# Patient Record
Sex: MALE | Race: OTHER | HISPANIC OR LATINO | Employment: OTHER | URBAN - METROPOLITAN AREA
[De-identification: names, ages, dates, MRNs, and addresses within clinical notes are randomized per-mention and may not be internally consistent; named-entity substitution may affect disease eponyms.]

---

## 2017-01-19 ENCOUNTER — APPOINTMENT (OUTPATIENT)
Dept: LAB | Facility: CLINIC | Age: 47
End: 2017-01-19
Payer: COMMERCIAL

## 2017-01-19 ENCOUNTER — TRANSCRIBE ORDERS (OUTPATIENT)
Dept: LAB | Facility: CLINIC | Age: 47
End: 2017-01-19

## 2017-01-19 ENCOUNTER — GENERIC CONVERSION - ENCOUNTER (OUTPATIENT)
Dept: OTHER | Facility: OTHER | Age: 47
End: 2017-01-19

## 2017-01-19 ENCOUNTER — ALLSCRIPTS OFFICE VISIT (OUTPATIENT)
Dept: OTHER | Facility: OTHER | Age: 47
End: 2017-01-19

## 2017-01-19 DIAGNOSIS — R79.89 OTHER ABNORMAL BLOOD CHEMISTRY: Primary | ICD-10-CM

## 2017-01-19 LAB
HBA1C MFR BLD HPLC: 6.1 %
VENIPUNCTURE: NORMAL

## 2017-01-19 PROCEDURE — 36415 COLL VENOUS BLD VENIPUNCTURE: CPT | Performed by: NURSE PRACTITIONER

## 2017-01-20 ENCOUNTER — GENERIC CONVERSION - ENCOUNTER (OUTPATIENT)
Dept: OTHER | Facility: OTHER | Age: 47
End: 2017-01-20

## 2017-01-20 LAB
A/G RATIO (HISTORICAL): 1.4 (ref 1.1–2.5)
ALBUMIN SERPL BCP-MCNC: 4.3 G/DL (ref 3.5–5.5)
ALP SERPL-CCNC: 87 IU/L (ref 39–117)
ALT SERPL W P-5'-P-CCNC: 107 IU/L (ref 0–44)
AST SERPL W P-5'-P-CCNC: 84 IU/L (ref 0–40)
BILIRUB SERPL-MCNC: 0.7 MG/DL (ref 0–1.2)
BUN SERPL-MCNC: 14 MG/DL (ref 6–24)
BUN/CREA RATIO (HISTORICAL): 15 (ref 9–20)
CALCIUM SERPL-MCNC: 9.1 MG/DL (ref 8.7–10.2)
CHLORIDE SERPL-SCNC: 102 MMOL/L (ref 96–106)
CHOLEST SERPL-MCNC: 173 MG/DL (ref 100–199)
CHOLEST/HDLC SERPL: 4.8 RATIO UNITS (ref 0–5)
CO2 SERPL-SCNC: 24 MMOL/L (ref 18–29)
CREAT SERPL-MCNC: 0.91 MG/DL (ref 0.76–1.27)
DEPRECATED RDW RBC AUTO: 13.6 % (ref 12.3–15.4)
EGFR AFRICAN AMERICAN (HISTORICAL): 117 ML/MIN/1.73
EGFR-AMERICAN CALC (HISTORICAL): 101 ML/MIN/1.73
ERYTHROCYTE SEDIMENTATION RATE (HISTORICAL): 16 MM/HR (ref 0–15)
GLUCOSE SERPL-MCNC: 98 MG/DL (ref 65–99)
HCT VFR BLD AUTO: 43.1 % (ref 37.5–51)
HDLC SERPL-MCNC: 36 MG/DL
HGB BLD-MCNC: 15 G/DL (ref 12.6–17.7)
INTERPRETATION (HISTORICAL): NORMAL
LDLC SERPL CALC-MCNC: 109 MG/DL (ref 0–99)
MCH RBC QN AUTO: 30.1 PG (ref 26.6–33)
MCHC RBC AUTO-ENTMCNC: 34.8 G/DL (ref 31.5–35.7)
MCV RBC AUTO: 87 FL (ref 79–97)
PLATELET # BLD AUTO: 322 X10E3/UL (ref 150–379)
POTASSIUM SERPL-SCNC: 4.2 MMOL/L (ref 3.5–5.2)
RBC (HISTORICAL): 4.98 X10E6/UL (ref 4.14–5.8)
SODIUM SERPL-SCNC: 142 MMOL/L (ref 134–144)
TOT. GLOBULIN, SERUM (HISTORICAL): 3.1 G/DL (ref 1.5–4.5)
TOTAL PROTEIN (HISTORICAL): 7.4 G/DL (ref 6–8.5)
TRIGL SERPL-MCNC: 141 MG/DL (ref 0–149)
TSH SERPL DL<=0.05 MIU/L-ACNC: 2.78 UIU/ML (ref 0.45–4.5)
VLDLC SERPL CALC-MCNC: 28 MG/DL (ref 5–40)
WBC # BLD AUTO: 8.4 X10E3/UL (ref 3.4–10.8)

## 2017-02-02 ENCOUNTER — ALLSCRIPTS OFFICE VISIT (OUTPATIENT)
Dept: OTHER | Facility: OTHER | Age: 47
End: 2017-02-02

## 2017-02-02 DIAGNOSIS — R74.01 NONSPECIFIC ELEVATION OF LEVELS OF TRANSAMINASE AND LACTIC ACID DEHYDROGENASE (LDH): ICD-10-CM

## 2017-02-02 DIAGNOSIS — R74.02 NONSPECIFIC ELEVATION OF LEVELS OF TRANSAMINASE AND LACTIC ACID DEHYDROGENASE (LDH): ICD-10-CM

## 2017-02-02 DIAGNOSIS — R79.89 OTHER SPECIFIED ABNORMAL FINDINGS OF BLOOD CHEMISTRY: ICD-10-CM

## 2017-02-07 ENCOUNTER — GENERIC CONVERSION - ENCOUNTER (OUTPATIENT)
Dept: OTHER | Facility: OTHER | Age: 47
End: 2017-02-07

## 2017-02-07 ENCOUNTER — HOSPITAL ENCOUNTER (OUTPATIENT)
Dept: RADIOLOGY | Facility: HOSPITAL | Age: 47
Discharge: HOME | End: 2017-02-07
Payer: COMMERCIAL

## 2017-02-07 DIAGNOSIS — R79.89 OTHER SPECIFIED ABNORMAL FINDINGS OF BLOOD CHEMISTRY: ICD-10-CM

## 2017-02-07 PROCEDURE — 76700 US EXAM ABDOM COMPLETE: CPT

## 2017-02-23 ENCOUNTER — ALLSCRIPTS OFFICE VISIT (OUTPATIENT)
Dept: OTHER | Facility: OTHER | Age: 47
End: 2017-02-23

## 2017-02-24 ENCOUNTER — GENERIC CONVERSION - ENCOUNTER (OUTPATIENT)
Dept: OTHER | Facility: OTHER | Age: 47
End: 2017-02-24

## 2017-02-25 LAB
ALBUMIN SERPL BCP-MCNC: 4.3 G/DL (ref 3.5–5.5)
ALP SERPL-CCNC: 89 IU/L (ref 39–117)
ALPHA-1 ANTITRYPSIN (HISTORICAL): 131 MG/DL (ref 90–200)
ALT SERPL W P-5'-P-CCNC: 68 IU/L (ref 0–44)
AST SERPL W P-5'-P-CCNC: 43 IU/L (ref 0–40)
BILIRUB SERPL-MCNC: 0.8 MG/DL (ref 0–1.2)
BILIRUBIN DIRECT (HISTORICAL): 0.18 MG/DL (ref 0–0.4)
CERULOPLASMIN (HISTORICAL): 22 MG/DL (ref 16–31)
FERRITIN SERPL-MCNC: 178 NG/ML (ref 30–400)
HEPATITIS A IGM ANTIBODY (HISTORICAL): NEGATIVE
HEPATITIS B CORE IGM ANTIBODY (HISTORICAL): NEGATIVE
HEPATITIS B SURFACE ANTIGEN (HISTORICAL): NEGATIVE
HEPATITIS C ANTIBODY (HISTORICAL): <0.1 S/CO RATIO (ref 0–0.9)
IRON SATN MFR SERPL: 25 % (ref 15–55)
IRON SERPL-MCNC: 86 UG/DL (ref 38–169)
MITOCHONDRIAL(M2)AB (HISTORICAL): 5.3 UNITS (ref 0–20)
SMOOTH MUSCLE ANTIBODY (HISTORICAL): 12 UNITS (ref 0–19)
TIBC SERPL-MCNC: 346 UG/DL (ref 250–450)
TOTAL PROTEIN (HISTORICAL): 7.4 G/DL (ref 6–8.5)
UIBC (HISTORICAL): 260 UG/DL (ref 111–343)

## 2017-02-27 LAB — ANTINUCLEAR ANTIBODIES, IFA (HISTORICAL): NEGATIVE

## 2017-03-02 ENCOUNTER — GENERIC CONVERSION - ENCOUNTER (OUTPATIENT)
Dept: OTHER | Facility: OTHER | Age: 47
End: 2017-03-02

## 2017-03-08 LAB
LEFT EYE DIABETIC RETINOPATHY: NORMAL
RIGHT EYE DIABETIC RETINOPATHY: NORMAL

## 2017-04-11 ENCOUNTER — GENERIC CONVERSION - ENCOUNTER (OUTPATIENT)
Dept: OTHER | Facility: OTHER | Age: 47
End: 2017-04-11

## 2017-04-18 ENCOUNTER — GENERIC CONVERSION - ENCOUNTER (OUTPATIENT)
Dept: OTHER | Facility: OTHER | Age: 47
End: 2017-04-18

## 2017-07-19 ENCOUNTER — ALLSCRIPTS OFFICE VISIT (OUTPATIENT)
Dept: OTHER | Facility: OTHER | Age: 47
End: 2017-07-19

## 2017-10-19 ENCOUNTER — ALLSCRIPTS OFFICE VISIT (OUTPATIENT)
Dept: OTHER | Facility: OTHER | Age: 47
End: 2017-10-19

## 2017-10-19 LAB — HBA1C MFR BLD HPLC: 5.7 %

## 2018-01-12 VITALS
TEMPERATURE: 96.3 F | SYSTOLIC BLOOD PRESSURE: 130 MMHG | RESPIRATION RATE: 16 BRPM | HEIGHT: 68 IN | WEIGHT: 219 LBS | HEART RATE: 72 BPM | BODY MASS INDEX: 33.19 KG/M2 | DIASTOLIC BLOOD PRESSURE: 90 MMHG

## 2018-01-12 NOTE — RESULT NOTES
Verified Results  Blood Glucose- POC [id number] 11:40AM Jessica Lamar     Test Name Result Flag Reference   Glucose Finger Stick 97

## 2018-01-12 NOTE — RESULT NOTES
Verified Results  (1) COMPREHENSIVE METABOLIC PANEL [id number] 28:11GY [employer] [facility]     Test Name Result Flag Reference   Glucose, Serum 98 mg/dL  65-99   BUN 14 mg/dL  6-24   Creatinine, Serum 0 91 mg/dL  0 76-1 27   eGFR If NonAfricn Am 101 mL/min/1 73  >59   eGFR If Africn Am 117 mL/min/1 73  >59   BUN/Creatinine Ratio 15  9-20   Sodium, Serum 142 mmol/L  134-144   Potassium, Serum 4 2 mmol/L  3 5-5 2   Chloride, Serum 102 mmol/L     Carbon Dioxide, Total 24 mmol/L  18-29   Calcium, Serum 9 1 mg/dL  8 7-10 2   Protein, Total, Serum 7 4 g/dL  6 0-8 5   Albumin, Serum 4 3 g/dL  3 5-5 5   Globulin, Total 3 1 g/dL  1 5-4 5   A/G Ratio 1 4  1 1-2 5   Bilirubin, Total 0 7 mg/dL  0 0-1 2   Alkaline Phosphatase, S 87 IU/L     AST (SGOT) 84 IU/L H 0-40   ALT (SGPT) 107 IU/L H 0-44     (1) CBC/ PLT (NO DIFF) [id number] 12:00AM [facility]     Test Name Result Flag Reference   WBC 8 4 x10E3/uL  3 4-10 8   RBC 4 98 x10E6/uL  4 14-5 80   Hemoglobin 15 0 g/dL  12 6-17 7   Hematocrit 43 1 %  37 5-51 0   MCV 87 fL  79-97   MCH 30 1 pg  26 6-33 0   MCHC 34 8 g/dL  31 5-35 7   RDW 13 6 %  12 3-15 4   Platelets 033 P99J7/FR  150-379     (1) LIPID PANEL, FASTING [id number] 12:00AM [facility]     Test Name Result Flag Reference   Cholesterol, Total 173 mg/dL  100-199   Triglycerides 141 mg/dL  0-149   HDL Cholesterol 36 mg/dL L >39   VLDL Cholesterol Hadley 28 mg/dL  5-40   LDL Cholesterol Calc 109 mg/dL H 0-99   T  Chol/HDL Ratio 4 8 ratio units  0 0-5 0   T  Chol/HDL Ratio                                                             Men  Women                                               1/2 Avg  Risk  3 4    3 3                                                   Avg Risk  5 0    4 4                                                2X Avg  Risk  9 6    7 1                                                3X Avg  Risk 23 4   11 0     (1) TSH WITH FT4 REFLEX [id number] 12:00AM Marcia Commander     Test Name Result Flag Reference   TSH 2 780 uIU/mL  0 450-4 500     (LC) Sedimentation Rate-Westergren [id number] 12:00AM [facility]     Test Name Result Flag Reference   Sedimentation Rate-Westergren 16 mm/hr H 0-15     [facility]) Cardiovascular Risk Assessment [id number] 12:00AM [facility]     Test Name Result Flag Reference   Interpretation Note     -------------------------------  CARDIOVASCULAR REPORT:  -------------------------------  Current available clinical information suggests the  patient's risk is at least LOW  One major CHD risk factor is  present (HDL-C less than 40)  If the patient has CHD or a  CHD risk equivalent, the risk category is high  If patient  does not have CHD or a CHD risk equivalent, consider use of  the Pooled Cohort Equations to estimate 10-year CVD risk, as  individuals with greater than 7 5% risk may warrant more  intensive therapy  The calculator can be found at:  http://tools  cardiosource org/RSHCO-Bdpv-Iavpjhmbq/  -  Insulin resistance, obesity, excessive alcohol use, smoking,  thyroid disease, nephrotic syndrome, liver disease, and  certain medications are all causes of secondary  dyslipidemia  Consider evaluation if clinically indicated  -  Fasting status is unknown; lipid assessment and treatment  suggestions assume patient was fasting  Therapeutic  lifestyle changes are always valuable to achieve optimal  blood lipid status (diet, exercise, weight management)  -------------------------------  LIPID MANAGEMENT  Select one patient risk category based upon medical history  and clinical judgment  Additional risk factors such as  personal or family history of premature CHD, smoking, and  hypertension modify a patient's goals of therapy  In CVD  prevention, the intensity of therapy should be adjusted to  the level of patient risk  MODERATE intensity statin therapy  generally results in an average LDL-C reduction of 30% to  less than 50% from the untreated baseline   Examples include  (daily doses): atorvastatin 10-20 mg, rosuvastatin 5-10 mg,  simvastatin 20-40 mg, pravastatin 40-80 mg, lovastatin 40  mg  HIGH intensity statin therapy generally results in an  average LDL-C reduction of 50% or more from the untreated  baseline  Examples include (daily doses): atorvastatin 40-80  mg and rosuvastatin 20 mg   -------------------------------  LOW RISK ASSESSMENT AND TREATMENT SUGGESTIONS  -------------------------------  LDL-C is acceptable, was 113 and now is 109 mg/dL  Non-HDL  Cholesterol is acceptable, was 146 and now is 137 mg/dL  -  Considerations for use of statin therapy include family  history of premature atherosclerotic disease, elevated  coronary artery calcium score, ankle-brachial index < 0 9,  elevated CRP, or elevated 10-year or lifetime CVD risk   -------------------------------  INTERMEDIATE RISK ASSESSMENT AND TREATMENT SUGGESTIONS  -------------------------------  LDL-C is acceptable, was 113 and now is 109 mg/dL  Non-HDL  Cholesterol is acceptable, was 146 and now is 137 mg/dL  -  Consider measurement of LDL particle number or Apo B to  adjudicate need for further LDL lowering therapy  Consider  beginning or increasing statin  Factors that may influence  statin use include family history of premature  atherosclerotic disease, elevated coronary artery calcium  score, ankle-brachial index < 0 9, elevated CRP, or elevated  10-year or lifetime CVD risk  If statin cannot be tolerated  or increased, alternatives include use of an intestinal  agent (ezetimibe or bile acid sequestrant) or niacin   -------------------------------  HIGH RISK ASSESSMENT AND TREATMENT SUGGESTIONS  -------------------------------  LDL-C is borderline high, was 113 and now is 109 mg/dL  Non-HDL Cholesterol is borderline high, was 146 and now is  137 mg/dL  -  Begin statin  If statin already in use, consider increasing  dose to achieve at least a 50% LDL reduction from baseline    Moderate or high intensity statin is preferred  If statin  cannot be tolerated or increased, alternatives include use  of an intestinal agent (ezetimibe or bile acid sequestrant)  or niacin   -------------------------------  DISCLAIMER  These assessments and treatment suggestions are provided as  a convenience in support of the physician-patient  relationship and are not intended to replace the physician's  clinical judgment  They are derived from the national  guidelines in addition to other evidence and expert opinion  The clinician should consider this information within the  context of clinical opinion and the individual patient  SEE GUIDANCE FOR CARDIOVASCULAR REPORT: National Heart,  Lung, and Blood [place]'s Third Report of the NCEP Expert  Panel on Detection, Evaluation and Treatment of High Blood  Cholesterol in Adults (ATP III) (2002  NIH publication  ); Dafnel et al  Diabetes Care 2008; 31(4):811-82;  Merry et al  Clin Chem 2009; 55(3):407-419; Byron Angel et  al  2013 ACC/AHA guideline on the treatment of blood  cholesterol to reduce atherosclerotic cardiovascular risk in  adults: a report of the [employer] of  Cardiology/American Heart Association Task Force on Practice  Guidelines  Circulation 1497;997(OODGN 2):S1? S45  PDF Image            Signatures   Electronically signed by : ZENOBIA Ledesma; Jan 20 2017 12:49PM EST                       (Author)

## 2018-01-13 VITALS
TEMPERATURE: 97.9 F | WEIGHT: 221.25 LBS | DIASTOLIC BLOOD PRESSURE: 92 MMHG | SYSTOLIC BLOOD PRESSURE: 132 MMHG | OXYGEN SATURATION: 97 % | RESPIRATION RATE: 16 BRPM | BODY MASS INDEX: 33.53 KG/M2 | HEART RATE: 68 BPM | HEIGHT: 68 IN

## 2018-01-13 VITALS
HEIGHT: 68 IN | RESPIRATION RATE: 14 BRPM | TEMPERATURE: 97.3 F | BODY MASS INDEX: 32.43 KG/M2 | HEART RATE: 72 BPM | SYSTOLIC BLOOD PRESSURE: 110 MMHG | DIASTOLIC BLOOD PRESSURE: 72 MMHG | WEIGHT: 214 LBS

## 2018-01-13 VITALS
RESPIRATION RATE: 16 BRPM | DIASTOLIC BLOOD PRESSURE: 90 MMHG | SYSTOLIC BLOOD PRESSURE: 132 MMHG | BODY MASS INDEX: 32.89 KG/M2 | TEMPERATURE: 97.9 F | HEART RATE: 78 BPM | HEIGHT: 68 IN | WEIGHT: 217 LBS

## 2018-01-13 NOTE — RESULT NOTES
Verified Results  (1) HEMOGLOBIN A1C [id number] 12:00AM Kristina Pickens     Test Name Result Flag Reference   Hemoglobin A1c 6 5 % H 4 8-5 6   Pre-diabetes: 5 7 - 6 4           Diabetes: >6 4           Glycemic control for adults with diabetes: <7 0       Discussion/Summary   Good results

## 2018-01-13 NOTE — PROGRESS NOTES
Active Problems    1  Abnormal liver function test (790 6) (R79 89)   2  Benign essential hypertension (401 1) (I10)   3  Controlled diabetes mellitus (250 00) (E11 9)   4  Esophagitis, reflux (530 11) (K21 0)    Past Medical History    1  History of Abdominal pain, epigastric (789 06) (R10 13)   2  History of Chronic allergic conjunctivitis (372 14) (H10 45)   3  History of Impaired fasting glucose (790 21) (R73 01)    Surgical History    1  History of Excision Of Pterygium Right Eye    Family History    1  Family history of cardiac disorder (V17 49) (Z82 49)    Social History    · Denied: History of Alcohol Use (History)   · Daily Coffee Consumption (2  Cups/Day)   · Never A Smoker    Current Meds   1  Omeprazole 20 MG Oral Capsule Delayed Release; TAKE 1 CAPSULE DAILY EVERY   MORNING BEFORE BREAKFAST; Therapy: 94Ipe5434 to (Evaluate:18Jun2016)  Requested for: 91Srj3668; Last   Rx:[id number] Ordered   2  Pataday 0 2 % Ophthalmic Solution; INSTILL 1 DROP Twice daily in both eyes; Therapy: 81UXC1008 to (Last Rx:06Jan2016)  Requested for: 06HOV1407 Ordered   3  Ramipril 5 MG Oral Capsule; TAKE 1 CAPSULE BY MOUTH ONCE A DAY; Therapy: [id number] to (Evaluate:18May2016)  Requested for: 58TIQ8028; Last   Rx:20Nov2015 Ordered    Allergies    1  No Known Drug Allergies    Plan    1  OneTouch Delica Lancets Fine Miscellaneous; once daily   2  OneTouch Ultra Blue In [employer]; TEST ONCE DAILY    Discussion/Summary  Patient Education Record:   PATIENT EDUCATION RECORD   Indication for Services: hypertension and type 2 Diabetes Mellitus  He is ready to learn  He has no barriers to learning  Diabetes Disease Process:   He understands the pathophysiology of diabetes: Method: Handout  Response: Verbalizes Understanding   Discussed patient's type of diabetes: Method: Handout  Response: Verbalizes Understanding   Discussed diagnosis criteria: Method: Handout  Response: Verbalizes Understanding   Discussed treatment goals: Method: Handout  Response: Verbalizes Understanding   Discussed benefits of control: Method: Handout  Response: Verbalizes Understanding   Discussed treatment options: Method: Handout  Response: Verbalizes Understanding  Comments: The information was given to and taught to the patient and his wife,Doris and given to in Khmer as well  Healthy Eating:   Discussed general nutrition topics: Method: Instruction and Handout  Response: Verbalizes Understanding   Discussed importance of meal timing/consistency: Method: Instruction and Handout  Response: Verbalizes Understanding   Discussed nutrient types ( Cho/Fat/Protein): Method: Instruction and Handout  Response: Verbalizes Understanding   Discussed portion sizes: Method: Instruction and Handout  Response: Verbalizes Understanding   Discussed food label reading: Method: Instruction and Handout  Response: Verbalizes Understanding and Returns Demonstration  Provided food diary and instructions on use: Method: Instruction and HandoutResponse: Verbalizes UnderstandingResponse: His current weight is 219 6  Discussed weight management/weight loss: Method: Instruction and Handout  Response: Verbalizes Understanding   His current BMI 32 9  Discussed benefits of heart healthy meal choices: Method: Instruction and Handout  Response: Verbalizes Understanding   Discussed basic carbohydrate counting: Method: Instruction and Handout  Response: Verbalizes Understanding     Reviewed meter options: Method: Instruction and Handout  His blood glucose targets are: Pre-meal target <110, Post-meal target <140 and HS target <110  Monitoring:   Discussed option for monitoring SMBG: Method: Instruction and Handout  Response: Verbalizes Understanding  Discussed option for monitoring HgbA1c: Method: Instruction and Handout  Response: Verbalizes Understanding  Discussed target blood glucose ranges: Method: Instruction and Handout  Response: Verbalizes Understanding     Discussed target hemoglobin A1c: Method: Instruction and Handout  Response: Verbalizes Understanding  Response: Verbalizes Understanding and Returns Demonstration  Discussed Finger stick testing: Method: Instruction  Response: Returns Demonstration  Discussed proper stick techniques: Method: Instruction  Response: Returns Demonstration  Discussed alternate site testing: Method: Instruction  Response: Verbalizes Understanding  Discussed proper techniques, benefits, and precautions: Method: Instruction  Response: Verbalizes Understanding and Returns Demonstration  Discussed testing times: Method: Instruction and Handout  Response: Verbalizes Understanding  Discussed safe lancet disposal: Method: Instruction  Response: Returns Demonstration  Discussed options for record keeping: Method: Instruction and Handout  Response: Verbalizes Understanding and Returns Demonstration  Discussed reporting of readings to M D : Method: Instruction  Response: Verbalizes Understanding  He was given a One Touch Ultra meter  He was instructed how to use the meter  Education Plan/Path:  He needs the Living Well Class  ~He/She needs a follow up class in three months   Recommended He was given the following educational materials: Know Your Blood Glucose Numbers, The 15/15 Rule for Treating Low Blood Sugar, Blood Glucose Tracker, Diabetes Guidelines, Hyperglycemia/Hypoglycemia, Support Group schedule, Three Day Food Record, Carbohydrate Counting and portion booklet      Exercise: He was given the following handouts: My Weekly Action Plan      Signatures   Electronically signed by : MICHAEL Cobb ; Jun 21 2017 11:52AM EST                       (Author)

## 2018-01-14 VITALS
SYSTOLIC BLOOD PRESSURE: 122 MMHG | TEMPERATURE: 97.7 F | HEIGHT: 68 IN | BODY MASS INDEX: 32.89 KG/M2 | HEART RATE: 72 BPM | WEIGHT: 217 LBS | RESPIRATION RATE: 14 BRPM | DIASTOLIC BLOOD PRESSURE: 82 MMHG

## 2018-01-15 NOTE — RESULT NOTES
Message    Liver enzymes have improved from before and almost coming down to normal range  Most likely due to fatty liver which was discussed during office visit  As advised before patient to eat low-fat diet, regular exercise, try to lose weight, vitamin E  supplements 1000 units daily  Repeat liver enzymes in 3 months and patient was given prescription before  Spoke with wife, discussed results  CS         Verified Results  (1) ACUTE HEPATITIS PANEL [id number] 01:50PM [facility]     Test Name Result Flag Reference   Hep A Ab, IgM Negative  Negative   HBsAg Screen Negative  Negative   Hep B Core Ab, IgM Negative  Negative   Hep C Virus Ab <0 1 s/co ratio  0 0-0 9   Negative:     < 0 8                                              Indeterminate: 0 8 - 0 9                                                   Positive:     > 0 9                  The CDC recommends that a positive HCV antibody result                  be followed up with a HCV Nucleic Acid Amplification                  test (537872)  (1) FERRITIN [id number] 01:50PM [facility]     Test Name Result Flag Reference   Ferritin, Serum 178 ng/mL       (1) ALPHA 1 ANTITRYPSIN [id number] 01:50PM [facility]     Test Name Result Flag Reference   Alpha-1-Antitrypsin, Serum 131 mg/dL       (1) CERULOPLASMIN [id number] 01:50PM [facility]     Test Name Result Flag Reference   Ceruloplasmin 22 0 mg/dL  16 0-31 0     (1) HEPATIC FUNCTION PANEL [id number] 01:50PM [facility]     Test Name Result Flag Reference   Protein, Total, Serum 7 4 g/dL  6 0-8 5   Albumin, Serum 4 3 g/dL  3 5-5 5   Bilirubin, Total 0 8 mg/dL  0 0-1 2   Bilirubin, Direct 0 18 mg/dL  0 00-0 40   **Please note reference interval change**   Alkaline Phosphatase, S 89 IU/L     AST (SGOT) 43 IU/L H 0-40   ALT (SGPT) 68 IU/L H 0-44     (1) IRON SATURATION %, TIBC [id number] 01:50PM [facility]     Test Name Result Flag Reference   Iron Bind  Cap (TIBC) 346 ug/dL 250-450   UIBC 260 ug/dL  111-343   Iron, Serum 86 ug/dL     Iron Saturation 25 %  15-55     (LC) Actin (Smooth Muscle) Antibody [id number] 01:50PM Luellen Puls     Test Name Result Flag Reference   Actin (Smooth Muscle) Antibody 12 Units  0-19   Negative                     0 - 19                                  Weak positive               20 - 30                                  Moderate to strong positive     >30                  Actin Antibodies are found in 52-85% of patients with                  autoimmune hepatitis or chronic active hepatitis and                  in 22% of patients with primary biliary cirrhosis  (LC) Mitochondrial (M2) Antibody [id number] 01:50PM [facility]uls     Test Name Result Flag Reference   Mitochondrial (M2) Antibody 5 3 Units  0 0-20 0   Negative    0 0 - 20 0                                                 Equivocal  20 1 - 24 9                                                 Positive         >24 9                 Mitochondrial (M2) Antibodies are found in 90-96% of                 patients with primary biliary cirrhosis       (LC) Antinuclear Antibodies, IFA [id number] 01:50PM [facility]uls     Test Name Result Flag Reference   Antinuclear Antibodies, IFA Negative     Negative   <1:80                                                      Borderline  1:80                                                      Positive   >1:80

## 2018-01-16 NOTE — RESULT NOTES
Verified Results  * US ABDOMEN COMPLETE [id number] 08:04AM Glory Matthew Order Number: [id number]    - Patient Instructions: To schedule this appointment, please contact Central Scheduling at [phone]  Test Name Result Flag Reference   US ABDOMEN COMPLETE (Report)     ABDOMEN ULTRASOUND, COMPLETE     INDICATION: 60-year-old man with elevated liver enzyme levels  COMPARISON: None     TECHNIQUE: Real-time ultrasound of the abdomen was performed with a curvilinear transducer with both volumetric sweeps and still imaging techniques  FINDINGS:     PANCREAS: Obscured by acoustic shadowing from overlying gastrointestinal gas  AORTA AND IVC: Normal for patient's age  No aortic aneurysm  LIVER:   Size: Prominent  The liver measures 18 4 cm in the midclavicular line  Contour: Surface contour smooth  Parenchyma: Diffusely hyperechoic, a nonspecific finding, most commonly due to fatty infiltration  No evidence of mass  Main portal vein patent with hepatopetal flow  GALLBLADDER: No calculi  No pericholecystic fluid    Gallbladder wall normal in thickness, measuring 2 mm  Sonographer reports no sonographic Spring's sign  BILE DUCTS: CBD normal in caliber, measuring 4 mm  No intra-hepatic bile duct dilatation  KIDNEYS:    Right kidney measures 11 4 x 4 6 cm  Cortex normal in thickness and echogenicity  No masses  No calculus or hydronephrosis  Left kidney measures 12 0 x 6 2 cm  Cortex normal in thickness and echogenicity  No masses  No calculus or hydronephrosis  SPLEEN:    Normal, measuring 9 4 cm in length  Normal echogenicity  No mass or perisplenic collection  ASCITES: None  IMPRESSION:     1  Inadequate evaluation of the pancreas due to overlying gastrointestinal gas  2  Hepatic steatosis  Workstation performed: [id number]     Signed by:   Jessica Hartman MD   2/7/17       Discussion/Summary   u/s showing fatty liver   please make appointment with gastroenterologist as recommended at last ov     Signatures   Electronically signed by : ZENOBIA Olguin; Feb 7 2017  1:02PM EST                       (Author)

## 2018-06-22 ENCOUNTER — HOSPITAL ENCOUNTER (EMERGENCY)
Facility: HOSPITAL | Age: 48
Discharge: HOME | End: 2018-06-23
Attending: EMERGENCY MEDICINE | Admitting: EMERGENCY MEDICINE
Payer: SUBSIDIZED

## 2018-06-22 DIAGNOSIS — R10.13 EPIGASTRIC ABDOMINAL PAIN: Primary | ICD-10-CM

## 2018-06-23 ENCOUNTER — APPOINTMENT (EMERGENCY)
Dept: RADIOLOGY | Facility: HOSPITAL | Age: 48
End: 2018-06-23
Payer: SUBSIDIZED

## 2018-06-23 VITALS
TEMPERATURE: 98.9 F | RESPIRATION RATE: 18 BRPM | BODY MASS INDEX: 31.93 KG/M2 | OXYGEN SATURATION: 97 % | WEIGHT: 210 LBS | HEART RATE: 71 BPM | SYSTOLIC BLOOD PRESSURE: 172 MMHG | DIASTOLIC BLOOD PRESSURE: 84 MMHG

## 2018-06-23 LAB
ALBUMIN SERPL BCP-MCNC: 3.8 G/DL (ref 3.5–5)
ALP SERPL-CCNC: 74 U/L (ref 46–116)
ALT SERPL W P-5'-P-CCNC: 44 U/L (ref 12–78)
ANION GAP SERPL CALCULATED.3IONS-SCNC: 8 MMOL/L (ref 4–13)
AST SERPL W P-5'-P-CCNC: 20 U/L (ref 5–45)
BASOPHILS # BLD AUTO: 0.04 THOUSANDS/ΜL (ref 0–0.1)
BASOPHILS NFR BLD AUTO: 0 % (ref 0–1)
BILIRUB SERPL-MCNC: 0.7 MG/DL (ref 0.2–1)
BUN SERPL-MCNC: 15 MG/DL (ref 5–25)
CALCIUM SERPL-MCNC: 9 MG/DL (ref 8.3–10.1)
CHLORIDE SERPL-SCNC: 103 MMOL/L (ref 100–108)
CO2 SERPL-SCNC: 28 MMOL/L (ref 21–32)
CREAT SERPL-MCNC: 1.23 MG/DL (ref 0.6–1.3)
EOSINOPHIL # BLD AUTO: 0.11 THOUSAND/ΜL (ref 0–0.61)
EOSINOPHIL NFR BLD AUTO: 1 % (ref 0–6)
ERYTHROCYTE [DISTWIDTH] IN BLOOD BY AUTOMATED COUNT: 12.5 % (ref 11.6–15.1)
GFR SERPL CREATININE-BSD FRML MDRD: 69 ML/MIN/1.73SQ M
GLUCOSE SERPL-MCNC: 125 MG/DL (ref 65–140)
HCT VFR BLD AUTO: 45 % (ref 36.5–49.3)
HGB BLD-MCNC: 14.8 G/DL (ref 12–17)
IMM GRANULOCYTES # BLD AUTO: 0.03 THOUSAND/UL (ref 0–0.2)
IMM GRANULOCYTES NFR BLD AUTO: 0 % (ref 0–2)
LIPASE SERPL-CCNC: 136 U/L (ref 73–393)
LYMPHOCYTES # BLD AUTO: 2.21 THOUSANDS/ΜL (ref 0.6–4.47)
LYMPHOCYTES NFR BLD AUTO: 24 % (ref 14–44)
MCH RBC QN AUTO: 29.4 PG (ref 26.8–34.3)
MCHC RBC AUTO-ENTMCNC: 32.9 G/DL (ref 31.4–37.4)
MCV RBC AUTO: 89 FL (ref 82–98)
MONOCYTES # BLD AUTO: 0.64 THOUSAND/ΜL (ref 0.17–1.22)
MONOCYTES NFR BLD AUTO: 7 % (ref 4–12)
NEUTROPHILS # BLD AUTO: 6.15 THOUSANDS/ΜL (ref 1.85–7.62)
NEUTS SEG NFR BLD AUTO: 68 % (ref 43–75)
NRBC BLD AUTO-RTO: 0 /100 WBCS
PLATELET # BLD AUTO: 297 THOUSANDS/UL (ref 149–390)
PMV BLD AUTO: 9.7 FL (ref 8.9–12.7)
POTASSIUM SERPL-SCNC: 4 MMOL/L (ref 3.5–5.3)
PROT SERPL-MCNC: 7.5 G/DL (ref 6.4–8.2)
RBC # BLD AUTO: 5.04 MILLION/UL (ref 3.88–5.62)
SODIUM SERPL-SCNC: 139 MMOL/L (ref 136–145)
TROPONIN I SERPL-MCNC: <0.02 NG/ML
WBC # BLD AUTO: 9.18 THOUSAND/UL (ref 4.31–10.16)

## 2018-06-23 PROCEDURE — 83690 ASSAY OF LIPASE: CPT | Performed by: EMERGENCY MEDICINE

## 2018-06-23 PROCEDURE — 99285 EMERGENCY DEPT VISIT HI MDM: CPT

## 2018-06-23 PROCEDURE — 85025 COMPLETE CBC W/AUTO DIFF WBC: CPT | Performed by: EMERGENCY MEDICINE

## 2018-06-23 PROCEDURE — 84484 ASSAY OF TROPONIN QUANT: CPT | Performed by: EMERGENCY MEDICINE

## 2018-06-23 PROCEDURE — 36415 COLL VENOUS BLD VENIPUNCTURE: CPT | Performed by: EMERGENCY MEDICINE

## 2018-06-23 PROCEDURE — 93005 ELECTROCARDIOGRAM TRACING: CPT

## 2018-06-23 PROCEDURE — 96375 TX/PRO/DX INJ NEW DRUG ADDON: CPT

## 2018-06-23 PROCEDURE — 74177 CT ABD & PELVIS W/CONTRAST: CPT

## 2018-06-23 PROCEDURE — 96374 THER/PROPH/DIAG INJ IV PUSH: CPT

## 2018-06-23 PROCEDURE — 80053 COMPREHEN METABOLIC PANEL: CPT | Performed by: EMERGENCY MEDICINE

## 2018-06-23 RX ORDER — ONDANSETRON 2 MG/ML
4 INJECTION INTRAMUSCULAR; INTRAVENOUS ONCE
Status: COMPLETED | OUTPATIENT
Start: 2018-06-23 | End: 2018-06-23

## 2018-06-23 RX ORDER — MORPHINE SULFATE 4 MG/ML
4 INJECTION, SOLUTION INTRAMUSCULAR; INTRAVENOUS ONCE
Status: COMPLETED | OUTPATIENT
Start: 2018-06-23 | End: 2018-06-23

## 2018-06-23 RX ORDER — DICYCLOMINE HCL 20 MG
20 TABLET ORAL 3 TIMES DAILY PRN
Qty: 20 TABLET | Refills: 0 | Status: SHIPPED | OUTPATIENT
Start: 2018-06-23 | End: 2019-02-06

## 2018-06-23 RX ORDER — MAGNESIUM HYDROXIDE/ALUMINUM HYDROXICE/SIMETHICONE 120; 1200; 1200 MG/30ML; MG/30ML; MG/30ML
30 SUSPENSION ORAL ONCE
Status: COMPLETED | OUTPATIENT
Start: 2018-06-23 | End: 2018-06-23

## 2018-06-23 RX ADMIN — ALUMINUM HYDROXIDE, MAGNESIUM HYDROXIDE, AND SIMETHICONE 30 ML: 200; 200; 20 SUSPENSION ORAL at 01:16

## 2018-06-23 RX ADMIN — HYOSCYAMINE SULFATE 0.12 MG: 0.12 TABLET ORAL at 01:17

## 2018-06-23 RX ADMIN — IOHEXOL 100 ML: 350 INJECTION, SOLUTION INTRAVENOUS at 02:22

## 2018-06-23 RX ADMIN — ONDANSETRON 4 MG: 2 INJECTION INTRAMUSCULAR; INTRAVENOUS at 00:36

## 2018-06-23 RX ADMIN — MORPHINE SULFATE 4 MG: 4 INJECTION, SOLUTION INTRAMUSCULAR; INTRAVENOUS at 01:45

## 2018-06-23 RX ADMIN — LIDOCAINE HYDROCHLORIDE 15 ML: 20 SOLUTION ORAL; TOPICAL at 01:16

## 2018-06-23 NOTE — ED NOTES
Pt c/o left sided chest pain and epigastric pain, pt restless in bed  Dr Norma Davis aware, orders obtained       Kathleen Muhammad RN  06/23/18 [id number]

## 2018-06-23 NOTE — ED PROVIDER NOTES
History  Chief Complaint   Patient presents with    Abdominal Pain     started with abdominal pain yesterday, points to epigastric area and lower abdomen  c/o nausea, no vomiting     32-year-old  male, poor historian complains of epigastric abdominal pain that started after eating dinner tonight  Patient states he was not feeling well yesterday but today developed the abdominal pain  No fever, no vomiting, diarrhea, mild nausea  Patient with a history of reflux on Prilosec  Patient denies any lower abdominal pain, no urinary symptoms, no history of abdominal surgery  History provided by:  Patient and spouse  Abdominal Pain   Pain location:  Epigastric  Pain quality: aching and gnawing    Pain radiates to:  Does not radiate  Pain severity:  Moderate  Onset quality:  Gradual  Duration:  2 days  Timing:  Constant  Progression:  Unchanged  Chronicity:  New  Context: not alcohol use, not awakening from sleep, not eating, not recent sexual activity, not recent travel, not retching, not sick contacts and not trauma    Relieved by:  Nothing  Worsened by:  Nothing  Ineffective treatments:  None tried  Associated symptoms: chest pain and nausea    Associated symptoms: no chills, no cough, no diarrhea, no dysuria, no fever, no hematochezia, no shortness of breath, no sore throat and no vomiting    Risk factors: no alcohol abuse, has not had multiple surgeries and not obese        Prior to Admission Medications   Prescriptions Last Dose Informant Patient Reported? Taking?   omeprazole (PriLOSEC) 20 mg delayed release capsule   Yes No   Sig: Take 20 mg by mouth daily  ramipril (ALTACE) 5 mg capsule   Yes No   Sig: Take 5 mg by mouth daily Indications: High Blood Pressure        Facility-Administered Medications: None       Past Medical History:   Diagnosis Date    Diabetes mellitus ([address] )     GERD (gastroesophageal reflux disease)     Hypertension        Past Surgical History:   Procedure Laterality Date  EYE SURGERY      wxc  of ptergium of eye x 2       History reviewed  No pertinent family history  I have reviewed and agree with the history as documented  Social History   Substance Use Topics    Smoking status: Never Smoker    Smokeless tobacco: Not on file    Alcohol use No        Review of Systems   Constitutional: Negative for chills and fever  HENT: Negative  Negative for sore throat  Respiratory: Negative for cough, shortness of breath, wheezing and stridor  Cardiovascular: Positive for chest pain  Gastrointestinal: Positive for abdominal pain and nausea  Negative for blood in stool, diarrhea, hematochezia and vomiting  Endocrine: Negative  Genitourinary: Negative for dysuria  Musculoskeletal: Negative  Skin: Negative  Neurological: Negative  Hematological: Negative  Psychiatric/Behavioral: Negative  All other systems reviewed and are negative  Physical Exam  Physical Exam   Constitutional: He is oriented to person, place, and time  He appears well-developed and well-nourished  HENT:   Head: Normocephalic and atraumatic  Right Ear: External ear normal    Left Ear: External ear normal    Nose: Nose normal    Mouth/Throat: Oropharynx is clear and moist    Eyes: Conjunctivae and EOM are normal    Neck: Normal range of motion  Neck supple  Cardiovascular: Normal rate, regular rhythm, normal heart sounds and intact distal pulses  Pulmonary/Chest: Effort normal and breath sounds normal  No respiratory distress  Abdominal: Soft  Bowel sounds are normal  He exhibits no distension and no mass  There is no rebound and no guarding  Musculoskeletal: Normal range of motion  Neurological: He is alert and oriented to person, place, and time  Skin: Skin is warm and dry  Capillary refill takes less than 2 seconds  Psychiatric: He has a normal mood and affect   His behavior is normal  Judgment and thought content normal    Nursing note and vitals reviewed        Vital Signs  ED Triage Vitals   Temperature Pulse Respirations Blood Pressure SpO2   06/22/18 2355 06/22/18 2355 06/22/18 2355 06/22/18 2355 06/22/18 2355   98 9 °F (37 2 °C) 68 20 (!) 179/106 100 %      Temp Source Heart Rate Source Patient Position - Orthostatic VS BP Location FiO2 (%)   06/22/18 2355 06/23/18 0141 06/22/18 2355 06/22/18 2355 --   Tympanic Monitor Sitting Right arm       Pain Score       06/22/18 2355       9           Vitals:    06/22/18 2355 06/23/18 0141 06/23/18 0230   BP: (!) 179/106 (!) 174/105 (!) 176/99   Pulse: 68 68 68   Patient Position - Orthostatic VS: Sitting Lying Lying       Visual Acuity      ED Medications  Medications   ondansetron (ZOFRAN) injection 4 mg (4 mg Intravenous Given 6/23/18 0036)   aluminum-magnesium hydroxide-simethicone (MYLANTA) 200-200-20 mg/5 mL oral suspension 30 mL (30 mL Oral Given 6/23/18 0116)   lidocaine viscous (XYLOCAINE) 2 % mucosal solution 15 mL (15 mL Swish & Swallow Given 6/23/18 0116)   hyoscyamine (LEVSIN/SL) SL tablet 0 125 mg (0 125 mg Sublingual Given 6/23/18 0117)   morphine (PF) 4 mg/mL injection 4 mg (4 mg Intravenous Given 6/23/18 0145)   iohexol (OMNIPAQUE) 350 MG/ML injection (SINGLE-DOSE) 100 mL (100 mL Intravenous Given 6/23/18 0222)       Diagnostic Studies  Results Reviewed     Procedure Component Value Units Date/Time    Troponin I [[id number]]  (Normal) Collected:  06/23/18 0152    Lab Status:  Final result Specimen:  Blood from Arm, Right Updated:  06/23/18 0217     Troponin I <0 02 ng/mL     Comprehensive metabolic panel [[id number]] Collected:  06/23/18 0035    Lab Status:  Final result Specimen:  Blood from Arm, Right Updated:  06/23/18 0101     Sodium 139 mmol/L      Potassium 4 0 mmol/L      Chloride 103 mmol/L      CO2 28 mmol/L      Anion Gap 8 mmol/L      BUN 15 mg/dL      Creatinine 1 23 mg/dL      Glucose 125 mg/dL      Calcium 9 0 mg/dL      AST 20 U/L      ALT 44 U/L      Alkaline Phosphatase 74 U/L Total Protein 7 5 g/dL      Albumin 3 8 g/dL      Total Bilirubin 0 70 mg/dL      eGFR 69 ml/min/1 73sq m     Narrative:         National Kidney Disease Education Program recommendations are as follows:  GFR calculation is accurate only with a steady state creatinine  Chronic Kidney disease less than 60 ml/min/1 73 sq  meters  Kidney failure less than 15 ml/min/1 73 sq  meters      Lipase [[id number]]  (Normal) Collected:  06/23/18 0035    Lab Status:  Final result Specimen:  Blood from Arm, Right Updated:  06/23/18 0101     Lipase 136 u/L     CBC and differential [[id number]] Collected:  06/23/18 0035    Lab Status:  Final result Specimen:  Blood from Arm, Right Updated:  06/23/18 0046     WBC 9 18 Thousand/uL      RBC 5 04 Million/uL      Hemoglobin 14 8 g/dL      Hematocrit 45 0 %      MCV 89 fL      MCH 29 4 pg      MCHC 32 9 g/dL      RDW 12 5 %      MPV 9 7 fL      Platelets 701 Thousands/uL      nRBC 0 /100 WBCs      Neutrophils Relative 68 %      Immat GRANS % 0 %      Lymphocytes Relative 24 %      Monocytes Relative 7 %      Eosinophils Relative 1 %      Basophils Relative 0 %      Neutrophils Absolute 6 15 Thousands/µL      Immature Grans Absolute 0 03 Thousand/uL      Lymphocytes Absolute 2 21 Thousands/µL      Monocytes Absolute 0 64 Thousand/µL      Eosinophils Absolute 0 11 Thousand/µL      Basophils Absolute 0 04 Thousands/µL                  CT abdomen pelvis with contrast    (Results Pending)              Procedures  ECG 12 Lead Documentation  Date/Time: 6/23/2018 1:50 AM  Performed by: Humberto Leslie  Authorized by: Humberto Leslie     Indications / Diagnosis:  CP  ECG reviewed by me, the ED Provider: yes    Patient location:  ED  Previous ECG:     Comparison to cardiac monitor: Yes (NSR 67)    Interpretation:     Interpretation: normal    Rate:     ECG rate:  67    ECG rate assessment: normal    Rhythm:     Rhythm: sinus rhythm    Ectopy:     Ectopy: none    QRS:     QRS axis:  Normal    QRS intervals: Normal  Conduction:     Conduction: normal    ST segments:     ST segments:  Normal  T waves:     T waves: normal             Phone Contacts  ED Phone Contact    ED Course                               MDM  CritCare Time    Disposition  Final diagnoses:   Epigastric abdominal pain     Time reflects when diagnosis was documented in both MDM as applicable and the Disposition within this note     Time User Action Codes Description Comment    6/23/2018  3:45 AM Pop Osorio Add [R10 13] Epigastric abdominal pain       ED Disposition     ED Disposition Condition Comment    Discharge  [place] discharge to home/self care  Condition at discharge: Stable        Follow-up Information     Follow up With Specialties Details Why Contact Info    Marcus Skelton MD Family Medicine Schedule an appointment as soon as possible for a visit in 3 days As needed [address] [facility] [id number] [phone]            Patient's Medications   Discharge Prescriptions    DICYCLOMINE (BENTYL) 20 MG TABLET    Take 1 tablet (20 mg total) by mouth 3 (three) times a day as needed (pain) for up to 20 doses       Start Date: 6/23/2018 End Date: --       Order Dose: 20 mg       Quantity: 20 tablet    Refills: 0     No discharge procedures on file      ED Provider  Electronically Signed by           Araseli Fiore MD  06/23/18 5318

## 2018-06-23 NOTE — DISCHARGE INSTRUCTIONS
Dolor abdominal   LO QUE NECESITA SABER:   El dolor abdominal puede ser sordo, [place], o crystal  Usted puede sentir dolor localizado en lu katy área del abdomen o en todo el abdomen  El dolor puede ser causado por lu afección cooper estreñimiento, sensibilidad o intoxicación alimentaria, infección o lu obstrucción  Asimismo, el dolor abdominal puede deberse a lu hernia, apendicitis o Manzanita Metamora  Las enfermedades del hígado, la vesícula o el riñón también pueden causar dolor abdominal  La causa del dolor abdominal puede ser desconocida  INSTRUCCIONES SOBRE EL [facility]:   Regrese a la kishore de emergencias si:   · Usted comienza a sentir un dolor en el pecho o dificultad para respirar que antes no sentía  · Usted siente un dolor con pulsaciones en la parte superior del abdomen o en la parte inferior de la espalda que de repente se vuelve rodrigo  · El dolor se localiza en la parte inferior derecha del abdomen y [place] cuando se [place]  · Usted tiene fiebre por encima de los 100 4 °F (38 °C) o escalofríos  · Usted tiene vómitos y no puede retener líquidos ni alimentos en el estómago  · El dolor no mejora o empeora en las próximas 8 a 12 horas  · Usted nota rey en lincoln vómito o heces, o éstas tienen un aspecto negruzco y alquitranado  · Lincoln piel o las partes yogesh de shelia ojos se vuelven amarillentas  · Si usted es lu francia y presenta abundante sangrado vaginal que no es lincoln menstruación  Pregúntele a lincoln [place] vitaminas y minerales son adecuados para usted  · Usted siente dolor en la parte inferior de la espalda  · Usted es Lida Buttery y tiene dolor en los testículos  · Siente dolor al Cullen Martinez  · Usted tiene preguntas o inquietudes acerca de lincoln condición o cuidado  Acuda en 24 horas a lu leslie de seguimiento con lincoln médico o cooper se le indique:  Anote shelia preguntas para que se acuerde de hacerlas chana shelia visitas     Medicamentos:   · Milagros Herrmannist ser administrados para calmar lincoln estómago y prevenir los vómitos o para disminuir el dolor  Pregunte cómo se debe ruibn los analgésicos de forma rao  · [place] shelia medicamentos cooper se le haya indicado  Consulte con lincoln médico si usted tania que lincoln medicamento no le está ayudando o si presenta efectos secundarios  Infórmele si es alérgico a algún medicamento  Mantenga lu lista actualizada de los [place], las vitaminas y los productos herbales que sirena  Incluya los siguientes datos de los medicamentos: cantidad, frecuencia y motivo de administración  Traiga con usted la lista o los envases de la píldoras a shelia citas de seguimiento  Lleve la lista de los medicamentos con usted en dav de lu emergencia  © 2017 [address]  Information is for End User's use only and may not be sold, redistributed or otherwise used for commercial purposes  All illustrations and images included in CareNotes® are the copyrighted property of A D A M , Inc  or [address]  Esta información es sólo para uso en educación  Lincoln intención no es darle un consejo médico sobre enfermedades o tratamientos  Colsulte con lincoln Zelpha Roch farmacéutico antes de seguir cualquier régimen médico para saber si es seguro y efectivo para usted  Dolor epigástrico, cuidados ambulatorios   INFORMACIÓN GENERAL:   El dolor epigástrico  se siente en la parte media superior del abdomen entre las costillas y el ombligo  El dolor puede ser leve o intenso  El dolor se propaga de un lado a otro lugar del cuerpo  El dolor epigástrico puede ser lu señal de un problema grave que necesita recibir tratamiento     Los siguientes son los síntomas más comunes:   · Inflamación de lincoln estómago, hígado, páncreas, o intestinos    · Problemas del corazón, cooper un ataque al corazón    · Problemas digestivos, cooper indigestión, la enfermedad por reflujo gastroesofágico (Arthuro Pion), o intolerancia a la lactosa     · [employer], cooper Cheyenne Epps Harsha hernia, síndrome del intestino irritable (IBS), o cáncer    · Lu obstrucción en shelia intestinos o vesícula biliar    · Lu infección urinaria    · Lu lesión o lu cirugía previa en lincoln abdomen  Busque atención inmediata al presentar los siguientes síntomas:   · Síntomas de un ataque cardíaco:      ¨ Opresión, presión o dolor en lincoln pecho que dura más de 5 minutos o regresa    ¨ Incomodidad o dolor en lincoln Harry Magyar, estómago o brazo     ¨ Dificultad para respirar    ¨ Náuseas o vómito    ¨ Mareos o lu sudoración fría repentina, especialmente con dolor de pecho o dificultad para respirar    · Usted tiene un tin dolor que se irradia a lincoln mandíbula o espalda    · Usted tiene un tin dolor que empieza de repente y se empeora rápidamente     · Usted no puede tener lu evacuación intestinal y está vomitando    · Usted vomita o expectora rey    · Usted nota rey en lincoln Karen Regino o en shelia deposiciones intestinales    · Usted está soñoliento y lincoln respiración es más lenta que lo usual  El tratamiento para el dolor epigástrico  dependerá de lo que está causando lincoln dolor  A usted le pueden recetar medicamento para tratar el dolor o para detener el vómito  Es posible que también necesite medicamentos para reducir o controlar el ácido estomacal o para tratar Pitney Ladi  Topher [employer] shelia síntomas:   · Mantenga un registro escrito de shelia síntomas  Incluyendo cuándo [employer], cuánto dura y sí el dolor es crystal o sordo  También incluya todos los alimentos que consume o las actividades que estaba haciendo antes que el dolor empezara  Registre cualquier cosa que haya ayudado para aliviar el dolor  · Consuma lu variedad de alimentos saludables  [address] frutas, verduras, pan integral, productos lácteos descremados, frijoles, rhonda magras y pescado   Pregunte si es necesario que siga lu dieta especial  Ciertos alimentos pueden causar lincoln dolor, topher el alcohol o alimentos que son muy grasosos  Es posible que necesite comer comidas más pequeñas y con más frecuencia que de la usual      · Consuma líquidos según le indicaron  Pregunte cuánto líquido debe consumir al día y cuáles líquidos le recomiendan  No consuma bebidas que contengan alcohol o cafeína  Programe lu leslie con guillen proveedor de [employer] se le haya indicado: Anote shelia preguntas para que se acuerde de hacerlas chana shelia visitas  ACUERDOS SOBRE GUILLEN CUIDADO:   Usted tiene el derecho de participar en la planificación de guillen cuidado  Aprenda todo lo que pueda sobre guillen condición y cooper darle tratamiento  Discuta con shelia médicos shelia opciones de tratamiento para juntos decidir el cuidado que usted quiere recibir  Usted siempre tiene el derecho a rechazar guillen tratamiento  Esta información es sólo para uso en educación  Guillen intención no es darle un consejo médico sobre enfermedades o tratamientos  Colsulte con guillen Murlene Lusty farmacéutico antes de seguir cualquier régimen médico para saber si es seguro y efectivo para usted  © 2014 [address] is for End User's use only and may not be sold, redistributed or otherwise used for commercial purposes  All illustrations and images included in CareNotes® are the copyrighted property of A D A M , Inc  or Gabe Ospina

## 2018-06-25 LAB
ATRIAL RATE: 67 BPM
P AXIS: 59 DEGREES
PR INTERVAL: 164 MS
QRS AXIS: 77 DEGREES
QRSD INTERVAL: 94 MS
QT INTERVAL: 384 MS
QTC INTERVAL: 405 MS
T WAVE AXIS: 38 DEGREES
VENTRICULAR RATE: 67 BPM

## 2018-06-25 PROCEDURE — 93010 ELECTROCARDIOGRAM REPORT: CPT | Performed by: INTERNAL MEDICINE

## 2018-06-26 ENCOUNTER — VBI (OUTPATIENT)
Dept: ADMINISTRATIVE | Facility: OTHER | Age: 48
End: 2018-06-26

## 2018-06-26 ENCOUNTER — OFFICE VISIT (OUTPATIENT)
Dept: FAMILY MEDICINE CLINIC | Facility: CLINIC | Age: 48
End: 2018-06-26

## 2018-06-26 VITALS
SYSTOLIC BLOOD PRESSURE: 144 MMHG | BODY MASS INDEX: 32.28 KG/M2 | RESPIRATION RATE: 16 BRPM | HEIGHT: 68 IN | HEART RATE: 80 BPM | DIASTOLIC BLOOD PRESSURE: 90 MMHG | WEIGHT: 213 LBS | TEMPERATURE: 98.7 F

## 2018-06-26 DIAGNOSIS — R10.13 EPIGASTRIC PAIN: Primary | ICD-10-CM

## 2018-06-26 PROCEDURE — 99213 OFFICE O/P EST LOW 20 MIN: CPT | Performed by: NURSE PRACTITIONER

## 2018-06-26 NOTE — PROGRESS NOTES
Assessment/Plan:  1  Epigastric pain  Testing negative in ED  ? Ulcer  Continue prilosec  Refer to GI  [place] diet  - Ambulatory referral to Gastroenterology; Future    Subjective:      Patient ID: Jose Miranda is a 53 y o  male who presents for upper abdominal pain  ER follow up    Upper abdominal pain  Several days  Got worse  Went to ED 6/23  Testing negative  Never saw gastro  Denies diarrhea or vomiting  No fever  Is on prilosec daily  Given pain med from ED   Took once  Helps a little  Pain is worse after eating  The following portions of the patient's history were reviewed and updated as appropriate: allergies, current medications, past family history, past medical history, past social history, past surgical history and problem list     Review of Systems   Constitutional: Negative for activity change, appetite change, chills and fever  Respiratory: Negative for cough and shortness of breath  Cardiovascular: Negative for chest pain  Gastrointestinal: Positive for abdominal pain  Negative for abdominal distention, diarrhea, nausea and vomiting  Genitourinary: Negative for dysuria, hematuria and urgency  Musculoskeletal: Negative for back pain  Skin: Negative for pallor and rash  Neurological: Negative for dizziness and headaches  Hematological: Negative for adenopathy  Objective:  ED records reviewed  Pt seen 6/23/18  Seen for abdominal pain  CT abdomen/pelvis no acute abdominal pelvic pathology, hepatomegaly with fatty infiltration, colon diverticulosis  Ultrasound abdomen hepatic steatosis    Treated with zofran, mylanta, viscous lido, levsin, morphine  cmp nl, trop neg, cbc nl  EKG nsr   D/C home on bentyl  /90 (BP Location: Left arm, Patient Position: Sitting, Cuff Size: Standard)   Pulse 80   Temp 98 7 °F (37 1 °C)   Resp 16   Ht 5' 8" (1 727 m)   Wt 96 6 kg (213 lb)   BMI 32 39 kg/m²          Physical Exam   Constitutional: He is oriented to person, place, and time  He appears well-developed and well-nourished  No distress  Cardiovascular: Normal rate, regular rhythm and normal heart sounds  No murmur heard  Pulmonary/Chest: Effort normal and breath sounds normal  No respiratory distress  He has no wheezes  Abdominal: Soft  Bowel sounds are normal  He exhibits no distension  There is tenderness (mild epigastric pain to palpation  )  There is no rebound and no guarding  No organomegaly   Neurological: He is alert and oriented to person, place, and time  Skin: Skin is warm and dry  No rash noted  No erythema  Psychiatric: He has a normal mood and affect  His behavior is normal  Judgment and thought content normal    Vitals reviewed

## 2018-06-26 NOTE — PATIENT INSTRUCTIONS
Continue Prilosec  Bentyl as needed as prescribed from emergency room  Schedule appt with gastroenterologist as discussed  [place] diet  Dieta para úlceras estomacales y gastritis   LO QUE NECESITA SABER:   ¿Qué es lu dieta para úlceras estomacales y gastritis? Lu dieta para úlceras y gastritis es un plan alimenticio que limita los alimentos que irritan lincoln BJURHOLM  Ciertos alimentos [employer] síntomas cooper dolor de Pili FU, acidez estomacal o indigestión  ¿Qué alimentos jennifer limitar o evitar? Es posible que usted necesite evitar los alimentos ácidos, picantes o altos en grasa  No todos los alimentos afectan a las personas de la [address]  [employer] [employer]  Usted necesitará aprender cuáles alimentos empeoran shelia síntomas y tendrá que limitar esos alimentos  Los siguientes son algunos alimentos que podrían empeorar lu úlcera o los síntomas de la gastritis:  · Bebidas:      ¨ Leche entera y Viola chocolatada    ¨ Chocolate caliente y refrescos de cola    ¨ Cualquier bebida con cafeína    ¨ Café regular y descafeinado    ¨ Té de hierbabuena y menta atiya    ¨ Té atiya o dylan, regular o descafeinado    ¨ [facility] y tor[facility]    ¨ Bebidas alcohólicas    · Especias y condimentos:      Hermon Abed y tim    ¨ Polvo de [facility]    ¨ Semilla de mostaza y Angela Camarillo moscada    · Otros alimentos:      ¨ Alimentos lácteos hechos de leche entera o crema    ¨ Chocolate    ¨ Quesos picantes o de sabor tin, cooper de [employer] o irvin julita    ¨ Carne con alto contenido de grasa y muy condimentada, cooper el chorizo, salchichón, tocino, Jeny y embutidos    ¨ Chiles picantes    ¨ Productos derivados del tomate, cooper pasta de [place], salsa o jugo del mismo  ¿Cuáles alimentos puedo consumir? Consuma lu variedad de alimentos saludables de todos los grupos alimenticios  Consuma frutas, verduras, granos enteros y productos lácteos descremados o bajos en grasa   Los sharados ANGELIKA pepper integrales, los cereales, la pasta y el arroz integral  Elija la carne [place], aves de salinas (dot y Hal), pescado, frijoles, huevos y desmond secos  Un plan alimenticio saludable tiene un contenido bajo de grasas no saludables, sal y azúcar  Las grasas saludables incluyen el aceite de garcia y de canola  Solicite a guillen dietista más información acerca de un plan alimenticio saludable  ¿Qué otras pautas podrían ser [place]? · No coma varsha antes de acostarse  Deje de comer por lo menos 2 horas antes de acostarse  · Coma comidas pequeñas y con frecuencia  Es probable que guillen estómago tolere mejor comidas pequeñas y frecuentes en vez de comidas grandes  ACUERDOS SOBRE GUILLEN CUIDADO:   Usted tiene el derecho de ayudar a planear guillen cuidado  Discuta shelia opciones de tratamiento con shelia médicos para decidir el cuidado que usted desea recibir  Usted siempre tiene el derecho de rechazar el tratamiento  Esta información es sólo para uso en educación  Guillen intención no es darle un consejo médico sobre enfermedades o tratamientos  Colsulte con guillen Janny [place]ot farmacéutico antes de seguir cualquier régimen médico para saber si es seguro y efectivo para usted  © 2017 [address] Information is for End User's use only and may not be sold, redistributed or otherwise used for commercial purposes  All illustrations and images included in CareNotes® are the copyrighted property of A D A M , Inc  or Gabe Ospina

## 2018-06-28 ENCOUNTER — APPOINTMENT (OUTPATIENT)
Dept: LAB | Facility: HOSPITAL | Age: 48
End: 2018-06-28
Attending: INTERNAL MEDICINE

## 2018-06-28 ENCOUNTER — TRANSCRIBE ORDERS (OUTPATIENT)
Dept: ADMINISTRATIVE | Facility: HOSPITAL | Age: 48
End: 2018-06-28

## 2018-06-28 ENCOUNTER — OFFICE VISIT (OUTPATIENT)
Dept: GASTROENTEROLOGY | Facility: CLINIC | Age: 48
End: 2018-06-28

## 2018-06-28 VITALS
HEART RATE: 74 BPM | TEMPERATURE: 98.8 F | WEIGHT: 215 LBS | HEIGHT: 68 IN | DIASTOLIC BLOOD PRESSURE: 82 MMHG | BODY MASS INDEX: 32.58 KG/M2 | SYSTOLIC BLOOD PRESSURE: 128 MMHG

## 2018-06-28 DIAGNOSIS — R10.13 EPIGASTRIC PAIN: ICD-10-CM

## 2018-06-28 PROCEDURE — 87338 HPYLORI STOOL AG IA: CPT

## 2018-06-28 PROCEDURE — 99213 OFFICE O/P EST LOW 20 MIN: CPT | Performed by: INTERNAL MEDICINE

## 2018-06-28 NOTE — LETTER
June 28, 2018     ULI Vargas  [address] [place], [facility]  [address] [facility]    Patient: Mercedes Mart   YOB: 1970   Date of Visit: 6/28/2018       Dear Dr Rocio Raman: Thank you for referring Lexis Padillaesequiel to me for evaluation  Below are my notes for this consultation  If you have questions, please do not hesitate to call me  I look forward to following your patient along with you           Sincerely,        Nirmal Topete MD        CC: No Recipients

## 2018-06-28 NOTE — ASSESSMENT & PLAN NOTE
Upper abdominal pain-possible from peptic ulcer disease or diet related  Rule out H pylori gastritis   Patient reports having significant improvement of his symptoms     -continue Prilosec regularly    -discussed about upper endoscopy evaluation but patient would like to wait because of insurance issues    -check stool for H pylori antigen    -advised patient to call for any recurrent symptoms

## 2018-06-28 NOTE — PROGRESS NOTES
Follow-up Note -  [facility]  [place] 1970 male         Reason:  Epigastric pain    HPI:  Mr Sid Milligan came in because of abdominal pain  He was seen in the emergency room last Friday because an acute onset of epigastric pain associated with nausea  He had lab work and CT scan which were reported to be normal  He has history of GERD for which she takes Prilosec regularly  He had some mild pain for couple of days associated with bloating  He was given Bentyl in the emergency room but he took for couple of days  He feels lot better now and denies any more abdominal pain  No nausea or vomiting  Regular bowel movements and denies any blood or mucus in the stool  Good appetite, no recent weight loss  He denies any use of NSAIDs  REVIEW OF SYSTEMS: Review of Systems   Constitutional: Negative for activity change, appetite change, chills, diaphoresis, fatigue, fever and unexpected weight change  HENT: Negative for ear discharge, ear pain, facial swelling, hearing loss, nosebleeds, sore throat, tinnitus and voice change  Eyes: Negative for pain, discharge, redness, itching and visual disturbance  Respiratory: Negative for apnea, cough, chest tightness, shortness of breath and wheezing  Cardiovascular: Negative for chest pain and palpitations  Gastrointestinal:        As noted in HPI   Endocrine: Negative for cold intolerance, heat intolerance and polyuria  Genitourinary: Negative for difficulty urinating, dysuria, flank pain, hematuria and urgency  Musculoskeletal: Negative for arthralgias, back pain, gait problem, joint swelling and myalgias  Skin: Negative for rash and wound  Neurological: Negative for dizziness, tremors, seizures, speech difficulty, light-headedness, numbness and headaches  Hematological: Negative for adenopathy  Does not bruise/bleed easily  Psychiatric/Behavioral: Negative for agitation, behavioral problems and confusion   The patient is not nervous/anxious  Past Medical History:   Diagnosis Date    Diabetes mellitus ([address] )     GERD (gastroesophageal reflux disease)     Hypertension       Past Surgical History:   Procedure Laterality Date    EYE SURGERY      wxc  of ptergium of eye x 2     Social History     Social History    Marital status: /Civil Union     Spouse name: N/A    Number of children: N/A    Years of education: N/A     Occupational History    Not on file  Social History Main Topics    Smoking status: Never Smoker    Smokeless tobacco: Never Used    Alcohol use No    Drug use: No    Sexual activity: Not on file     Other Topics Concern    Not on file     Social History Narrative    No narrative on file     Family History   Problem Relation Age of Onset    Heart disease Mother      Patient has no known allergies  Current Outpatient Prescriptions   Medication Sig Dispense Refill    dicyclomine (BENTYL) 20 mg tablet Take 1 tablet (20 mg total) by mouth 3 (three) times a day as needed (pain) for up to 20 doses 20 tablet 0    omeprazole (PriLOSEC) 20 mg delayed release capsule Take 20 mg by mouth daily   ramipril (ALTACE) 5 mg capsule Take 5 mg by mouth daily Indications: High Blood Pressure  No current facility-administered medications for this visit  Blood pressure 128/82, pulse 74, temperature 98 8 °F (37 1 °C), temperature source Tympanic, height 5' 8" (1 727 m), weight 97 5 kg (215 lb)  PHYSICAL EXAM: Physical Exam   Constitutional: He is oriented to person, place, and time  He appears well-developed  HENT:   Head: Normocephalic and atraumatic  Mouth/Throat: Oropharynx is clear and moist    Eyes: Conjunctivae are normal  Pupils are equal, round, and reactive to light  Right eye exhibits no discharge  Left eye exhibits no discharge  No scleral icterus  Neck: Neck supple  No JVD present  No tracheal deviation present  No thyromegaly present     Cardiovascular: Normal rate, regular rhythm, normal heart sounds and intact distal pulses  Exam reveals no gallop and no friction rub  No murmur heard  Pulmonary/Chest: Effort normal and breath sounds normal  No respiratory distress  He has no wheezes  He has no rales  He exhibits no tenderness  Abdominal: Soft  Bowel sounds are normal  He exhibits no distension and no mass  There is no tenderness  There is no rebound and no guarding  No hernia  Musculoskeletal: He exhibits no edema  Lymphadenopathy:     He has no cervical adenopathy  Neurological: He is alert and oriented to person, place, and time  Skin: Skin is warm and dry  No rash noted  No erythema  Psychiatric: He has a normal mood and affect  His behavior is normal  Thought content normal         Lab Results   Component Value Date    WBC 9 18 06/23/2018    HGB 14 8 06/23/2018    HCT 45 0 06/23/2018    MCV 89 06/23/2018     06/23/2018     Lab Results   Component Value Date    GLUCOSE 125 06/23/2018    CALCIUM 9 0 06/23/2018     06/23/2018    K 4 0 06/23/2018    CO2 28 06/23/2018     06/23/2018    BUN 15 06/23/2018    CREATININE 1 23 06/23/2018     Lab Results   Component Value Date    ALT 44 06/23/2018    AST 20 06/23/2018    ALKPHOS 74 06/23/2018    BILITOT 0 70 06/23/2018     No results found for: INR, PROTIME    Ct Abdomen Pelvis With Contrast    Result Date: 6/23/2018  Impression: 1  No acute abdominal pelvic pathology  2   Hepatomegaly with fatty infiltration, similar to prior sonography  3   Colonic diverticulosis  4   Calcified and noncalcified subcentimeter bilateral lower lobe pulmonary nodules, largest measuring 4 mm  Based on current Fleischner Society 2017 Guidelines on incidental pulmonary nodule, no routine follow-up is needed if the patient is considered low risk for lung cancer  If the patient is considered high risk for lung cancer, 12 month follow-up non-contrast chest CT is recommended   Findings are consistent with the preliminary report from [facility] which was provided shortly after completion of the exam  Workstation performed: [id number]       ASSESSMENT & PLAN:    Epigastric pain  Upper abdominal pain-possible from peptic ulcer disease or diet related  Rule out H pylori gastritis   Patient reports having significant improvement of his symptoms     -continue Prilosec regularly    -discussed about upper endoscopy evaluation but patient would like to wait because of insurance issues    -check stool for H pylori antigen    -advised patient to call for any recurrent symptoms

## 2018-06-30 LAB — H PYLORI AG STL QL IA: NEGATIVE

## 2018-07-30 DIAGNOSIS — I10 ESSENTIAL HYPERTENSION: Primary | ICD-10-CM

## 2018-07-30 RX ORDER — RAMIPRIL 10 MG/1
CAPSULE ORAL
Qty: 90 CAPSULE | Refills: 2 | Status: SHIPPED | OUTPATIENT
Start: 2018-07-30 | End: 2019-04-22 | Stop reason: SDUPTHER

## 2019-02-06 ENCOUNTER — OFFICE VISIT (OUTPATIENT)
Dept: FAMILY MEDICINE CLINIC | Facility: CLINIC | Age: 49
End: 2019-02-06

## 2019-02-06 VITALS
SYSTOLIC BLOOD PRESSURE: 122 MMHG | HEIGHT: 68 IN | RESPIRATION RATE: 16 BRPM | DIASTOLIC BLOOD PRESSURE: 80 MMHG | WEIGHT: 220.2 LBS | BODY MASS INDEX: 33.37 KG/M2 | HEART RATE: 80 BPM | TEMPERATURE: 98.7 F

## 2019-02-06 DIAGNOSIS — G44.209 TENSION-TYPE HEADACHE, NOT INTRACTABLE, UNSPECIFIED CHRONICITY PATTERN: Primary | ICD-10-CM

## 2019-02-06 DIAGNOSIS — Z91.09 ENVIRONMENTAL ALLERGIES: ICD-10-CM

## 2019-02-06 DIAGNOSIS — I10 ESSENTIAL HYPERTENSION: ICD-10-CM

## 2019-02-06 PROCEDURE — 99213 OFFICE O/P EST LOW 20 MIN: CPT | Performed by: FAMILY MEDICINE

## 2019-02-06 RX ORDER — CETIRIZINE HYDROCHLORIDE 10 MG/1
10 TABLET, CHEWABLE ORAL DAILY
Qty: 30 TABLET | Refills: 0 | Status: SHIPPED | OUTPATIENT
Start: 2019-02-06 | End: 2019-09-06

## 2019-02-06 NOTE — PROGRESS NOTES
Henrik Felipe 1970 male MRN: [id number]    FAMILY PRACTICE OFFICE VISIT  [facility] - [address]      ASSESSMENT/PLAN  Henrik Felipe is a 53 y o  male presents to the office for    Diagnoses and all orders for this visit:    Tension-type headache, not intractable, unspecified chronicity pattern    Environmental allergies  -     cetirizine (ZyrTEC) 10 MG chewable tablet; Chew 1 tablet (10 mg total) daily    Essential hypertension       Tamazight speaking patient    Headache seem to be more related to uncontrolled allergies  Start zyrtec at bedtime  Schedule ramipril at the same time frame every day ( keep log)  Take Excedrin as needed  Return in  2 weeks to see if there is improvement        No future appointments  SUBJECTIVE  CC: Headache (x 1 month )      HPI:  Henrik Felipe is a 53 y o  male who presents for an acute appointment  Headaches x 1 month, believe its 2/2 to uncontrolled HTN  Hasn't taken BPs at home  No photophobia  Congestion, but no hx of allergies ( however wife says he is always congeseted)  Headaches are b/l sharp, worse in the morning  Relieve by ibuprofen however always continue to be dull    Review of Systems   Constitutional: Negative for activity change, appetite change, chills, fatigue and fever  HENT: Positive for congestion  Eyes: Negative for visual disturbance  Respiratory: Negative for cough, chest tightness and shortness of breath  Cardiovascular: Negative for chest pain and leg swelling  Gastrointestinal: Negative for abdominal distention, abdominal pain, constipation, diarrhea, nausea and vomiting  Allergic/Immunologic: Positive for environmental allergies  Neurological: Positive for headaches  Negative for dizziness and light-headedness  All other systems reviewed and are negative        Historical Information   The patient history was reviewed as follows:  Past Medical History:   Diagnosis Date    Diabetes mellitus ([facility] )     GERD (gastroesophageal reflux disease)     Hypertension          Medications:     Current Outpatient Prescriptions:     omeprazole (PriLOSEC) 20 mg delayed release capsule, Take 20 mg by mouth daily  , Disp: , Rfl:     ramipril (ALTACE) 10 MG capsule, take 1 capsule by mouth once daily, Disp: 90 capsule, Rfl: 2    cetirizine (ZyrTEC) 10 MG chewable tablet, Chew 1 tablet (10 mg total) daily, Disp: 30 tablet, Rfl: 0    No Known Allergies    OBJECTIVE  Vitals:   Vitals:    02/06/19 1327   BP: 122/80   BP Location: Left arm   Patient Position: Sitting   Cuff Size: Standard   Pulse: 80   Resp: 16   Temp: 98 7 °F (37 1 °C)   Weight: 99 9 kg (220 lb 3 2 oz)   Height: 5' 8" (1 727 m)         Physical Exam   Constitutional: He is oriented to person, place, and time  Vital signs are normal  He appears well-developed and well-nourished  HENT:   Head: Normocephalic and atraumatic  Right Ear: External ear and ear canal normal  A middle ear effusion is present  Left Ear: External ear and ear canal normal  A middle ear effusion is present  Nose: Mucosal edema (L>R) present  Mouth/Throat: Posterior oropharyngeal erythema present  Eyes: Pupils are equal, round, and reactive to light  Conjunctivae and EOM are normal    Neck: Normal range of motion  Neck supple  Cardiovascular: Normal rate, regular rhythm, S1 normal, S2 normal, normal heart sounds and intact distal pulses  No murmur heard  Pulmonary/Chest: Effort normal and breath sounds normal  No respiratory distress  He has no wheezes  Musculoskeletal: Normal range of motion  He exhibits no edema  Neurological: He is alert and oriented to person, place, and time  He has normal strength  He displays normal reflexes  No cranial nerve deficit  He exhibits normal muscle tone  Coordination normal    Skin: Skin is warm  Psychiatric: He has a normal mood and affect   His speech is normal and behavior is normal  Judgment and thought content normal  Vitals reviewed                   Buckley Schlatter, MD,   [facility]  2/6/2019

## 2019-04-22 DIAGNOSIS — I10 ESSENTIAL HYPERTENSION: ICD-10-CM

## 2019-04-23 RX ORDER — RAMIPRIL 10 MG/1
10 CAPSULE ORAL DAILY
Qty: 30 CAPSULE | Refills: 0 | Status: SHIPPED | OUTPATIENT
Start: 2019-04-23 | End: 2019-06-03 | Stop reason: SDUPTHER

## 2019-06-03 DIAGNOSIS — I10 ESSENTIAL HYPERTENSION: ICD-10-CM

## 2019-06-03 RX ORDER — RAMIPRIL 10 MG/1
10 CAPSULE ORAL DAILY
Qty: 90 CAPSULE | Refills: 0 | Status: SHIPPED | OUTPATIENT
Start: 2019-06-03 | End: 2019-09-03 | Stop reason: SDUPTHER

## 2019-09-03 DIAGNOSIS — I10 ESSENTIAL HYPERTENSION: ICD-10-CM

## 2019-09-03 RX ORDER — RAMIPRIL 10 MG/1
10 CAPSULE ORAL DAILY
Qty: 30 CAPSULE | Refills: 0 | Status: SHIPPED | OUTPATIENT
Start: 2019-09-03 | End: 2019-09-06 | Stop reason: SDUPTHER

## 2019-09-06 ENCOUNTER — OFFICE VISIT (OUTPATIENT)
Dept: FAMILY MEDICINE CLINIC | Facility: CLINIC | Age: 49
End: 2019-09-06

## 2019-09-06 VITALS
SYSTOLIC BLOOD PRESSURE: 140 MMHG | RESPIRATION RATE: 18 BRPM | HEART RATE: 66 BPM | TEMPERATURE: 97 F | HEIGHT: 68 IN | WEIGHT: 227.8 LBS | BODY MASS INDEX: 34.53 KG/M2 | DIASTOLIC BLOOD PRESSURE: 92 MMHG

## 2019-09-06 DIAGNOSIS — I10 ESSENTIAL HYPERTENSION: Primary | ICD-10-CM

## 2019-09-06 PROCEDURE — 99213 OFFICE O/P EST LOW 20 MIN: CPT | Performed by: NURSE PRACTITIONER

## 2019-09-06 RX ORDER — RAMIPRIL 10 MG/1
10 CAPSULE ORAL DAILY
Qty: 90 CAPSULE | Refills: 1 | Status: SHIPPED | OUTPATIENT
Start: 2019-09-06 | End: 2020-03-30 | Stop reason: SDUPTHER

## 2019-09-06 NOTE — PATIENT INSTRUCTIONS

## 2019-09-06 NOTE — PROGRESS NOTES
Assessment/Plan:    Problem List Items Addressed This Visit        Cardiovascular and Mediastinum    Essential hypertension - Primary    Relevant Medications    ramipril (ALTACE) 10 MG capsule    Other Relevant Orders    Basic metabolic panel    CBC and Platelet    TSH, 3rd generation with Free T4 reflex        The case discussed with patient using patient centered shared decision making  The patient was counseled regarding instructions for management,-- risk factor reductions,-- prognosis,-- impressions,-- risks and benefits of treatment options,-- importance of compliance with treatment  I have reviewed the instructions with the patient, answering all questions to his satisfaction  Pt agrees to get cbc, bmp, tsh  He does not have insurance  Declines other labs, screenings  He will review pt ed materials  He will return in 1 month for bp check  BMI Counseling: Body mass index is 34 64 kg/m²  Discussed the patient's BMI with him  The BMI is above average  BMI counseling and education was provided to the patient  Nutrition recommendations include reducing portion sizes, decreasing overall calorie intake, 3-5 servings of fruits/vegetables daily and moderation in carbohydrate intake  Exercise recommendations include moderate aerobic physical activity for 150 minutes/week  Patient Instructions   Heart Healthy Diet   AMBULATORY CARE:   A heart healthy diet  is an eating plan low in total fat, unhealthy fats, and sodium (salt)  A heart healthy diet helps decrease your risk for heart disease and stroke  Limit the amount of fat you eat to 25% to 35% of your total daily calories  Limit sodium to less than 2,300 mg each day  Healthy fats:  Healthy fats can help improve cholesterol levels  The risk for heart disease is decreased when cholesterol levels are normal  Choose healthy fats, such as the following:  · Unsaturated fat  is found in foods such as soybean, canola, olive, corn, and safflower oils   It is also found in soft tub margarine that is made with liquid vegetable oil  · Omega-3 fat  is found in certain fish, such as salmon, tuna, and trout, and in walnuts and flaxseed  Unhealthy fats:  Unhealthy fats can cause unhealthy cholesterol levels in your blood and increase your risk of heart disease  Limit unhealthy fats, such as the following:  · Cholesterol  is found in animal foods, such as eggs and lobster, and in dairy products made from whole milk  Limit cholesterol to less than 300 milligrams (mg) each day  You may need to limit cholesterol to 200 mg each day if you have heart disease  · Saturated fat  is found in meats, such as lu and hamburger  It is also found in chicken or turkey skin, whole milk, and butter  Limit saturated fat to less than 7% of your total daily calories  Limit saturated fat to less than 6% if you have heart disease or are at increased risk for it  · Trans fat  is found in packaged foods, such as potato chips and cookies  It is also in hard margarine, some fried foods, and shortening  Avoid trans fats as much as possible    Heart healthy foods and drinks to include:  Ask your dietitian or healthcare provider how many servings to have from each of the following food groups:  · Grains:      ¨ Whole-wheat breads, cereals, and pastas, and brown rice    ¨ Low-fat, low-sodium crackers and chips    · Vegetables:      ¨ Broccoli, green beans, green peas, and spinach    ¨ Collards, kale, and lima beans    ¨ Carrots, sweet potatoes, tomatoes, and peppers    ¨ Canned vegetables with no salt added    · Fruits:      ¨ Bananas, peaches, pears, and pineapple    ¨ Grapes, raisins, and dates    ¨ Oranges, tangerines, grapefruit, orange juice, and grapefruit juice    ¨ Apricots, mangoes, melons, and papaya    ¨ Raspberries and strawberries    ¨ Canned fruit with no added sugar    · Low-fat dairy products:      ¨ Nonfat (skim) milk, 1% milk, and low-fat almond, cashew, or soy milks fortified with calcium    ¨ Low-fat cheese, regular or frozen yogurt, and cottage cheese    · Meats and proteins , such as lean cuts of beef and pork (loin, leg, round), skinless chicken and turkey, legumes, soy products, egg whites, and nuts  Foods and drinks to limit or avoid:  Ask your dietitian or healthcare provider about these and other foods that are high in unhealthy fat, sodium, and sugar:  · Snack or packaged foods , such as frozen dinners, cookies, macaroni and cheese, and cereals with more than 300 mg of sodium per serving    · Canned or dry mixes  for cakes, soups, sauces, or gravies    · Vegetables with added sodium , such as instant potatoes, vegetables with added sauces, or regular canned vegetables    · Other foods high in sodium , such as ketchup, barbecue sauce, salad dressing, pickles, olives, soy sauce, and miso    · High-fat dairy foods  such as whole or 2% milk, cream cheese, or sour cream, and cheeses     · High-fat protein foods  such as high-fat cuts of beef (T-bone steaks, ribs), chicken or turkey with skin, and organ meats, such as liver    · Cured or smoked meats , such as hot dogs, lu, and sausage    · Unhealthy fats and oils , such as butter, stick margarine, shortening, and cooking oils such as coconut or palm oil    · Food and drinks high in sugar , such as soft drinks (soda), sports drinks, sweetened tea, candy, cake, cookies, pies, and doughnuts  Other diet guidelines to follow:   · Eat more foods containing omega-3 fats  Eat fish high in omega-3 fats at least 2 times a week  · Limit alcohol  Too much alcohol can damage your heart and raise your blood pressure  Women should limit alcohol to 1 drink a day  Men should limit alcohol to 2 drinks a day  A drink of alcohol is 12 ounces of beer, 5 ounces of wine, or 1½ ounces of liquor  · Choose low-sodium foods  High-sodium foods can lead to high blood pressure  Add little or no salt to food you prepare   Use herbs and spices in place of salt     · Eat more fiber  to help lower cholesterol levels  Eat at least 5 servings of fruits and vegetables each day  Eat 3 ounces of whole-grain foods each day  Legumes (beans) are also a good source of fiber  Lifestyle guidelines:   · Do not smoke  Nicotine and other chemicals in cigarettes and cigars can cause lung and heart damage  Ask your healthcare provider for information if you currently smoke and need help to quit  E-cigarettes or smokeless tobacco still contain nicotine  Talk to your healthcare provider before you use these products  · Exercise regularly  to help you maintain a healthy weight and improve your blood pressure and cholesterol levels  Ask your healthcare provider about the best exercise plan for you  Do not start an exercise program without asking your healthcare provider  Follow up with your healthcare provider as directed:  Write down your questions so you remember to ask them during your visits  © 2017 [facility] Information is for End User's use only and may not be sold, redistributed or otherwise used for commercial purposes  All illustrations and images included in CareNotes® are the copyrighted property of A D A M [employer] Inc  or Gabe Anai  The above information is an  only  It is not intended as medical advice for individual conditions or treatments  Talk to your doctor, nurse or pharmacist before following any medical regimen to see if it is safe and effective for you  Return in about 4 weeks (around 10/4/2019)  Subjective:      Patient ID: Mani Haas is a 51 y o  male  Chief Complaint   Patient presents with    Follow-up     BP        Hypertension   This is a chronic problem  The current episode started more than 1 year ago  The problem has been waxing and waning since onset  Associated symptoms include headaches   Pertinent negatives include no blurred vision, chest pain, malaise/fatigue, peripheral edema or shortness of breath  There are no associated agents to hypertension  Risk factors for coronary artery disease include male gender and obesity (dm, lipid status unknown)  Past treatments include ACE inhibitors  The current treatment provides mild improvement  Compliance problems include diet  The following portions of the patient's history were reviewed and updated as appropriate: allergies, current medications, past family history, past medical history, past social history, past surgical history and problem list     Review of Systems   Constitutional: Negative  Negative for malaise/fatigue  Eyes: Negative for blurred vision  Respiratory: Negative  Negative for shortness of breath  Cardiovascular: Negative  Negative for chest pain  Neurological: Positive for headaches  Negative for dizziness and weakness  Current Outpatient Medications   Medication Sig Dispense Refill    omeprazole (PriLOSEC) 20 mg delayed release capsule Take 20 mg by mouth daily   ramipril (ALTACE) 10 MG capsule Take 1 capsule (10 mg total) by mouth daily 90 capsule 1     No current facility-administered medications for this visit  Objective:    /92   Pulse 66   Temp (!) 97 °F (36 1 °C)   Resp 18   Ht 5' 8" (1 727 m)   Wt 103 kg (227 lb 12 8 oz)   BMI 34 64 kg/m²        Physical Exam   Constitutional: He is oriented to person, place, and time  He appears well-developed and well-nourished  No distress  bp elevated   Cardiovascular: Normal rate, regular rhythm and intact distal pulses  Pulmonary/Chest: Effort normal and breath sounds normal    Neurological: He is alert and oriented to person, place, and time  Nursing note and vitals reviewed               Mariya Brown [facility]

## 2019-09-24 ENCOUNTER — TRANSCRIBE ORDERS (OUTPATIENT)
Dept: ADMINISTRATIVE | Facility: HOSPITAL | Age: 49
End: 2019-09-24

## 2019-09-24 ENCOUNTER — APPOINTMENT (OUTPATIENT)
Dept: LAB | Facility: HOSPITAL | Age: 49
End: 2019-09-24
Payer: COMMERCIAL

## 2019-09-24 DIAGNOSIS — I10 ESSENTIAL HYPERTENSION: ICD-10-CM

## 2019-09-24 LAB
ANION GAP SERPL CALCULATED.3IONS-SCNC: 7 MMOL/L (ref 4–13)
BUN SERPL-MCNC: 13 MG/DL (ref 5–25)
CALCIUM SERPL-MCNC: 8.9 MG/DL (ref 8.3–10.1)
CHLORIDE SERPL-SCNC: 103 MMOL/L (ref 100–108)
CO2 SERPL-SCNC: 31 MMOL/L (ref 21–32)
CREAT SERPL-MCNC: 0.9 MG/DL (ref 0.6–1.3)
ERYTHROCYTE [DISTWIDTH] IN BLOOD BY AUTOMATED COUNT: 12.1 % (ref 11.6–15.1)
GFR SERPL CREATININE-BSD FRML MDRD: 101 ML/MIN/1.73SQ M
GLUCOSE P FAST SERPL-MCNC: 131 MG/DL (ref 65–99)
HCT VFR BLD AUTO: 48 % (ref 36.5–49.3)
HGB BLD-MCNC: 15.4 G/DL (ref 12–17)
MCH RBC QN AUTO: 29.4 PG (ref 26.8–34.3)
MCHC RBC AUTO-ENTMCNC: 32.1 G/DL (ref 31.4–37.4)
MCV RBC AUTO: 92 FL (ref 82–98)
PLATELET # BLD AUTO: 312 THOUSANDS/UL (ref 149–390)
PMV BLD AUTO: 10.2 FL (ref 8.9–12.7)
POTASSIUM SERPL-SCNC: 4 MMOL/L (ref 3.5–5.3)
RBC # BLD AUTO: 5.24 MILLION/UL (ref 3.88–5.62)
SODIUM SERPL-SCNC: 141 MMOL/L (ref 136–145)
TSH SERPL DL<=0.05 MIU/L-ACNC: 1.66 UIU/ML (ref 0.36–3.74)
WBC # BLD AUTO: 7 THOUSAND/UL (ref 4.31–10.16)

## 2019-09-24 PROCEDURE — 84443 ASSAY THYROID STIM HORMONE: CPT

## 2019-09-24 PROCEDURE — 36415 COLL VENOUS BLD VENIPUNCTURE: CPT

## 2019-09-24 PROCEDURE — 85027 COMPLETE CBC AUTOMATED: CPT

## 2019-09-24 PROCEDURE — 80048 BASIC METABOLIC PNL TOTAL CA: CPT

## 2020-03-30 DIAGNOSIS — I10 ESSENTIAL HYPERTENSION: ICD-10-CM

## 2020-03-30 RX ORDER — RAMIPRIL 10 MG/1
10 CAPSULE ORAL DAILY
Qty: 90 CAPSULE | Refills: 1 | Status: SHIPPED | OUTPATIENT
Start: 2020-03-30 | End: 2020-10-09 | Stop reason: SDUPTHER

## 2020-10-09 DIAGNOSIS — I10 ESSENTIAL HYPERTENSION: ICD-10-CM

## 2020-10-09 RX ORDER — RAMIPRIL 10 MG/1
10 CAPSULE ORAL DAILY
Qty: 30 CAPSULE | Refills: 1 | Status: SHIPPED | OUTPATIENT
Start: 2020-10-09 | End: 2020-10-23 | Stop reason: SDUPTHER

## 2020-10-23 ENCOUNTER — OFFICE VISIT (OUTPATIENT)
Dept: FAMILY MEDICINE CLINIC | Facility: CLINIC | Age: 50
End: 2020-10-23

## 2020-10-23 VITALS
DIASTOLIC BLOOD PRESSURE: 70 MMHG | HEIGHT: 69 IN | HEART RATE: 71 BPM | SYSTOLIC BLOOD PRESSURE: 138 MMHG | TEMPERATURE: 97.5 F | RESPIRATION RATE: 16 BRPM | BODY MASS INDEX: 33.15 KG/M2 | WEIGHT: 223.8 LBS

## 2020-10-23 DIAGNOSIS — I10 ESSENTIAL HYPERTENSION: ICD-10-CM

## 2020-10-23 DIAGNOSIS — Z00.00 HEALTH CARE MAINTENANCE: Primary | ICD-10-CM

## 2020-10-23 PROCEDURE — 99396 PREV VISIT EST AGE 40-64: CPT | Performed by: NURSE PRACTITIONER

## 2020-10-23 RX ORDER — RAMIPRIL 10 MG/1
10 CAPSULE ORAL DAILY
Qty: 90 CAPSULE | Refills: 3 | Status: SHIPPED | OUTPATIENT
Start: 2020-10-23 | End: 2021-01-13 | Stop reason: SDUPTHER

## 2020-12-16 ENCOUNTER — OFFICE VISIT (OUTPATIENT)
Dept: FAMILY MEDICINE CLINIC | Facility: CLINIC | Age: 50
End: 2020-12-16

## 2020-12-16 VITALS
WEIGHT: 226 LBS | HEIGHT: 68 IN | TEMPERATURE: 97.6 F | DIASTOLIC BLOOD PRESSURE: 88 MMHG | BODY MASS INDEX: 34.25 KG/M2 | SYSTOLIC BLOOD PRESSURE: 138 MMHG | HEART RATE: 69 BPM | OXYGEN SATURATION: 96 % | RESPIRATION RATE: 14 BRPM

## 2020-12-16 DIAGNOSIS — E11.69 TYPE 2 DIABETES MELLITUS WITH OTHER SPECIFIED COMPLICATION, WITHOUT LONG-TERM CURRENT USE OF INSULIN (HCC): Primary | ICD-10-CM

## 2020-12-16 LAB — SL AMB POCT HEMOGLOBIN AIC: 8.1 (ref ?–6.5)

## 2020-12-16 PROCEDURE — 83036 HEMOGLOBIN GLYCOSYLATED A1C: CPT | Performed by: FAMILY MEDICINE

## 2020-12-17 LAB
ALBUMIN SERPL-MCNC: 4.5 G/DL (ref 4–5)
ALBUMIN/GLOB SERPL: 1.6 {RATIO} (ref 1.2–2.2)
ALP SERPL-CCNC: 106 IU/L (ref 39–117)
ALT SERPL-CCNC: 68 IU/L (ref 0–44)
AST SERPL-CCNC: 48 IU/L (ref 0–40)
BILIRUB SERPL-MCNC: 0.4 MG/DL (ref 0–1.2)
BUN SERPL-MCNC: 14 MG/DL (ref 6–24)
BUN/CREAT SERPL: 17 (ref 9–20)
CALCIUM SERPL-MCNC: 9.6 MG/DL (ref 8.7–10.2)
CHLORIDE SERPL-SCNC: 98 MMOL/L (ref 96–106)
CHOLEST SERPL-MCNC: 183 MG/DL (ref 100–199)
CO2 SERPL-SCNC: 24 MMOL/L (ref 20–29)
CREAT SERPL-MCNC: 0.83 MG/DL (ref 0.76–1.27)
GLOBULIN SER-MCNC: 2.8 G/DL (ref 1.5–4.5)
GLUCOSE SERPL-MCNC: 146 MG/DL (ref 65–99)
HDLC SERPL-MCNC: 36 MG/DL
LDLC SERPL CALC-MCNC: 106 MG/DL (ref 0–99)
MICRODELETION SYND BLD/T FISH: NORMAL
POTASSIUM SERPL-SCNC: 4.1 MMOL/L (ref 3.5–5.2)
PROT SERPL-MCNC: 7.3 G/DL (ref 6–8.5)
SL AMB EGFR AFRICAN AMERICAN: 119 ML/MIN/1.73
SL AMB EGFR NON AFRICAN AMERICAN: 103 ML/MIN/1.73
SL AMB VLDL CHOLESTEROL CALC: 41 MG/DL (ref 5–40)
SODIUM SERPL-SCNC: 138 MMOL/L (ref 134–144)
TRIGL SERPL-MCNC: 239 MG/DL (ref 0–149)

## 2021-01-05 ENCOUNTER — TELEPHONE (OUTPATIENT)
Dept: FAMILY MEDICINE CLINIC | Facility: CLINIC | Age: 51
End: 2021-01-05

## 2021-01-07 NOTE — TELEPHONE ENCOUNTER
Spoke with patient about elevated triglycerides and LDL  Starting Lipitor  Patient has an appointment next week on the 18th and will discuss further at that appointment  Elevated liver enzymes  Does not drink

## 2021-01-13 ENCOUNTER — OFFICE VISIT (OUTPATIENT)
Dept: FAMILY MEDICINE CLINIC | Facility: CLINIC | Age: 51
End: 2021-01-13

## 2021-01-13 VITALS
OXYGEN SATURATION: 96 % | SYSTOLIC BLOOD PRESSURE: 128 MMHG | BODY MASS INDEX: 33.95 KG/M2 | RESPIRATION RATE: 14 BRPM | WEIGHT: 224 LBS | HEART RATE: 73 BPM | HEIGHT: 68 IN | TEMPERATURE: 97.2 F | DIASTOLIC BLOOD PRESSURE: 78 MMHG

## 2021-01-13 DIAGNOSIS — E78.5 HYPERLIPIDEMIA, UNSPECIFIED HYPERLIPIDEMIA TYPE: ICD-10-CM

## 2021-01-13 DIAGNOSIS — R79.89 ELEVATED LFTS: ICD-10-CM

## 2021-01-13 DIAGNOSIS — I10 ESSENTIAL HYPERTENSION: ICD-10-CM

## 2021-01-13 DIAGNOSIS — E11.65 TYPE 2 DIABETES MELLITUS WITH HYPERGLYCEMIA, WITHOUT LONG-TERM CURRENT USE OF INSULIN (HCC): Primary | ICD-10-CM

## 2021-01-13 PROCEDURE — 99212 OFFICE O/P EST SF 10 MIN: CPT | Performed by: FAMILY MEDICINE

## 2021-01-13 RX ORDER — RAMIPRIL 10 MG/1
10 CAPSULE ORAL DAILY
Qty: 90 CAPSULE | Refills: 0 | Status: SHIPPED | OUTPATIENT
Start: 2021-01-13 | End: 2021-05-24

## 2021-01-13 RX ORDER — ATORVASTATIN CALCIUM 10 MG/1
10 TABLET, FILM COATED ORAL DAILY
Qty: 90 TABLET | Refills: 0 | Status: SHIPPED | OUTPATIENT
Start: 2021-01-13 | End: 2021-04-21

## 2021-01-13 RX ORDER — MULTIVIT WITH MINERALS/LUTEIN
1000 TABLET ORAL DAILY
COMMUNITY

## 2021-01-13 NOTE — ASSESSMENT & PLAN NOTE
Will start statin on patient  Patient advised diet modification  Given elevated lfts, will repeat in 1 month, if still elevated will have patient go for liver ultrasound  Agrees with plan  Side effects of medication reviewed

## 2021-01-13 NOTE — ASSESSMENT & PLAN NOTE
Advised to monitor blood glucose fasting  [place] and [place] medications for it  Refilled  Discussion on testing blood glucose when fasting in the morning  Continue with metformin  Repeat in 3 months     Lab Results   Component Value Date    HGBA1C 8 1 (A) 12/16/2020

## 2021-01-13 NOTE — ASSESSMENT & PLAN NOTE
Elevated alt/ast  Patient advised weight loss and diet modification  Repeat in 1 month  If elevated patient needs to get ultrasound

## 2021-01-13 NOTE — PROGRESS NOTES
Assessment/Plan:    1  Type 2 diabetes mellitus with hyperglycemia, without long-term current use of insulin (HCC)  Assessment & Plan:  Advised to monitor blood glucose fasting  Patient will be leaving the country for a month and needs medications for it  Refilled  Discussion on testing blood glucose when fasting in the morning  Continue with metformin  Repeat in 3 months  Lab Results   Component Value Date    HGBA1C 8 1 (A) 12/16/2020       Orders:  -     metFORMIN (GLUCOPHAGE) 1000 MG tablet; Take 1 tablet (1,000 mg total) by mouth 2 (two) times a day with meals    2  Essential hypertension  Assessment & Plan: Bp stable at this time  Weight loss, low salt, and change in diet recommended  Orders:  -     ramipril (ALTACE) 10 MG capsule; Take 1 capsule (10 mg total) by mouth daily    3  Hyperlipidemia, unspecified hyperlipidemia type  Assessment & Plan: Will start statin on patient  Patient advised diet modification  Given elevated lfts, will repeat in 1 month, if still elevated will have patient go for liver ultrasound  Agrees with plan  Side effects of medication reviewed  Orders:  -     atorvastatin (LIPITOR) 10 mg tablet; Take 1 tablet (10 mg total) by mouth daily    4  Elevated LFTs  Assessment & Plan:  Elevated alt/ast  Patient advised weight loss and diet modification  Repeat in 1 month  If elevated patient needs to get ultrasound  Return in about 1 month (around 2/13/2021)  Subjective:      Patient ID: Renan Houston is a 53 y o  male  Chief Complaint   Patient presents with    Follow-up     dm check up       HPI  52 y/o male presents for follow up for type 2 DM and bloodwork  Type 2 DM: discussion on a1c 8 1  Fasting glucose was 140, and has been taking it after meals and it was 134 recently  Trying to make adjustments to diet but having a hard time sometimes  HTN: Bp stable  On ramipril  HLD: discussion on elevated tag, ldl  Cvd: 8 7% elevated lfts at this time  Denies any chest pain or shortness of breath  Elevated lfts  No smoking, drinking, does have a diet with fried food and unhealthy,     The following portions of the patient's history were reviewed and updated as appropriate: allergies, current medications, past family history, past medical history, past social history, past surgical history and problem list     Review of Systems   Constitutional: Negative for fever  HENT: Negative for ear discharge and sore throat  Eyes: Negative for visual disturbance  Respiratory: Negative for cough and shortness of breath  Cardiovascular: Negative for chest pain and leg swelling  Gastrointestinal: Negative for abdominal pain, constipation, nausea and vomiting  Genitourinary: Negative for dysuria  Musculoskeletal: Negative for back pain  Skin: Negative for rash  Neurological: Negative for dizziness, weakness, light-headedness and headaches  Psychiatric/Behavioral: Negative for agitation  Current Outpatient Medications   Medication Sig Dispense Refill    Ascorbic Acid (vitamin C) 1000 MG tablet Take 1,000 mg by mouth daily      metFORMIN (GLUCOPHAGE) 1000 MG tablet Take 1 tablet (1,000 mg total) by mouth 2 (two) times a day with meals 90 tablet 0    omeprazole (PriLOSEC) 20 mg delayed release capsule Take 20 mg by mouth daily   ramipril (ALTACE) 10 MG capsule Take 1 capsule (10 mg total) by mouth daily 90 capsule 0    vitamin E 100 UNIT capsule Take 100 Units by mouth daily      atorvastatin (LIPITOR) 10 mg tablet Take 1 tablet (10 mg total) by mouth daily 90 tablet 0     No current facility-administered medications for this visit          Objective:    /78 (BP Location: Right arm, Patient Position: Sitting, Cuff Size: Large)   Pulse 73   Temp (!) 97 2 °F (36 2 °C) (Temporal)   Resp 14   Ht 5' 8" (1 727 m)   Wt 102 kg (224 lb)   SpO2 96%   BMI 34 06 kg/m²        Physical Exam  Constitutional:       Appearance: He is well-developed  He is not toxic-appearing  HENT:      Head: Normocephalic and atraumatic  Right Ear: External ear normal       Left Ear: External ear normal       Nose: Nose normal       Mouth/Throat:      Pharynx: No oropharyngeal exudate  Eyes:      General:         Right eye: No discharge  Left eye: No discharge  Conjunctiva/sclera: Conjunctivae normal    Neck:      Musculoskeletal: Neck supple  Cardiovascular:      Rate and Rhythm: Normal rate and regular rhythm  Pulses: Normal pulses  Heart sounds: Normal heart sounds  No murmur  Pulmonary:      Effort: Pulmonary effort is normal  No respiratory distress  Breath sounds: Normal breath sounds  No wheezing  Abdominal:      General: Bowel sounds are normal  There is no distension  Palpations: Abdomen is soft  Tenderness: There is no abdominal tenderness  Musculoskeletal: Normal range of motion  General: No deformity  Lymphadenopathy:      Cervical: No cervical adenopathy  Skin:     General: Skin is warm  Findings: No rash  Neurological:      Mental Status: He is alert and oriented to person, place, and time                  Bang Miller MD

## 2021-03-17 ENCOUNTER — OFFICE VISIT (OUTPATIENT)
Dept: FAMILY MEDICINE CLINIC | Facility: CLINIC | Age: 51
End: 2021-03-17

## 2021-03-17 ENCOUNTER — APPOINTMENT (OUTPATIENT)
Dept: LAB | Facility: HOSPITAL | Age: 51
End: 2021-03-17

## 2021-03-17 VITALS
SYSTOLIC BLOOD PRESSURE: 120 MMHG | WEIGHT: 221 LBS | OXYGEN SATURATION: 96 % | HEART RATE: 76 BPM | TEMPERATURE: 97.4 F | HEIGHT: 68 IN | RESPIRATION RATE: 14 BRPM | BODY MASS INDEX: 33.49 KG/M2 | DIASTOLIC BLOOD PRESSURE: 80 MMHG

## 2021-03-17 DIAGNOSIS — E78.5 HYPERLIPIDEMIA, UNSPECIFIED HYPERLIPIDEMIA TYPE: ICD-10-CM

## 2021-03-17 DIAGNOSIS — E11.65 TYPE 2 DIABETES MELLITUS WITH HYPERGLYCEMIA, WITHOUT LONG-TERM CURRENT USE OF INSULIN (HCC): Primary | ICD-10-CM

## 2021-03-17 LAB
ALBUMIN SERPL BCP-MCNC: 4.2 G/DL (ref 3.5–5)
ALP SERPL-CCNC: 113 U/L (ref 46–116)
ALT SERPL W P-5'-P-CCNC: 74 U/L (ref 12–78)
ANION GAP SERPL CALCULATED.3IONS-SCNC: 4 MMOL/L (ref 4–13)
AST SERPL W P-5'-P-CCNC: 42 U/L (ref 5–45)
BILIRUB SERPL-MCNC: 0.88 MG/DL (ref 0.2–1)
BUN SERPL-MCNC: 20 MG/DL (ref 5–25)
CALCIUM SERPL-MCNC: 9.1 MG/DL (ref 8.3–10.1)
CHLORIDE SERPL-SCNC: 104 MMOL/L (ref 100–108)
CHOLEST SERPL-MCNC: 91 MG/DL (ref 50–200)
CO2 SERPL-SCNC: 30 MMOL/L (ref 21–32)
CREAT SERPL-MCNC: 0.92 MG/DL (ref 0.6–1.3)
GFR SERPL CREATININE-BSD FRML MDRD: 97 ML/MIN/1.73SQ M
GLUCOSE SERPL-MCNC: 143 MG/DL (ref 65–140)
HDLC SERPL-MCNC: 29 MG/DL
LDLC SERPL CALC-MCNC: 40 MG/DL (ref 0–100)
POTASSIUM SERPL-SCNC: 4.4 MMOL/L (ref 3.5–5.3)
PROT SERPL-MCNC: 7.9 G/DL (ref 6.4–8.2)
SL AMB POCT HEMOGLOBIN AIC: 7.5 (ref ?–6.5)
SODIUM SERPL-SCNC: 138 MMOL/L (ref 136–145)
TRIGL SERPL-MCNC: 109 MG/DL

## 2021-03-17 PROCEDURE — 80061 LIPID PANEL: CPT | Performed by: FAMILY MEDICINE

## 2021-03-17 PROCEDURE — 36415 COLL VENOUS BLD VENIPUNCTURE: CPT | Performed by: FAMILY MEDICINE

## 2021-03-17 PROCEDURE — 99212 OFFICE O/P EST SF 10 MIN: CPT | Performed by: FAMILY MEDICINE

## 2021-03-17 PROCEDURE — 83036 HEMOGLOBIN GLYCOSYLATED A1C: CPT | Performed by: FAMILY MEDICINE

## 2021-03-17 PROCEDURE — 80053 COMPREHEN METABOLIC PANEL: CPT | Performed by: FAMILY MEDICINE

## 2021-03-17 NOTE — ASSESSMENT & PLAN NOTE
Patient discussion on medication  He does have sitagliptin- metformin medication that he got from his country which she is currently self pay  He would prefer to take that over metformin twice a day as it does cause him to be bloated  Patient advise can take other medication  And has to stop metformin but should be monitoring blood glucose closely  A1c has improved from 8 1-7 5  Monitor for any hypoglycemia     Patient should keep a blood glucose log and bring to the office with new medication     Lab Results   Component Value Date    HGBA1C 7 5 (A) 03/17/2021

## 2021-03-17 NOTE — PROGRESS NOTES
Assessment/Plan:    1  Type 2 diabetes mellitus with hyperglycemia, without long-term current use of insulin [facility])  Assessment & Plan:    Patient discussion on medication  He does have sitagliptin- metformin medication that he got from [place] which she is currently self pay  He would prefer to take that over metformin twice a day as it does cause him to be bloated  Patient advise can take other medication  And has to stop metformin but should be monitoring blood glucose closely  A1c has improved from 8 1-7 5  Monitor for any hypoglycemia  Patient should keep a blood glucose log and bring to the office with new medication     Lab Results   Component Value Date    HGBA1C 7 5 (A) 03/17/2021       Orders:  -     POCT hemoglobin A1c  -     Comprehensive metabolic panel; Future  -     Lipid Panel with Direct LDL reflex; Future  -     Lipid Panel with Direct LDL reflex; Future  -     Comprehensive metabolic panel; Future  -     Lipid Panel with Direct LDL reflex  -     Comprehensive metabolic panel    2  Hyperlipidemia, unspecified hyperlipidemia type  Assessment & Plan:    Continue with Lipitor  Add Omega 3 fish oil supplement  Will check lipids  Orders:  -     Comprehensive metabolic panel; Future  -     Lipid Panel with Direct LDL reflex; Future  -     Lipid Panel with Direct LDL reflex; Future  -     Comprehensive metabolic panel; Future  -     Lipid Panel with Direct LDL reflex  -     Comprehensive metabolic panel            Return in about 3 months (around 6/17/2021)  Subjective:      Patient ID: Rome Frausto is a 53 y o  male  Chief Complaint   Patient presents with    Diabetes       HPI  60-year-old male presents for diabetic check  Patient states that he was not taking medication as he regularly does as he went to go visit his country and did forget  He ate poorly with lots of carbs, tortillas, and rice  He states that his blood sugar was running maybe 180s    Since the last few days that he has been eating better it has been around 130s  Patient is currently on Lipitor 10 mg  Denies any chest pain, shortness of breath, or abdominal pain  The following portions of the patient's history were reviewed and updated as appropriate: allergies, current medications, past family history, past medical history, past social history, past surgical history and problem list     Review of Systems   Constitutional: Negative for fever  HENT: Negative for congestion and sore throat  Eyes: Negative for visual disturbance  Respiratory: Negative for cough and shortness of breath  Cardiovascular: Negative for chest pain and leg swelling  Gastrointestinal: Negative for abdominal pain, constipation, nausea and vomiting  Genitourinary: Negative for dysuria  Musculoskeletal: Negative for back pain  Skin: Negative for rash  Neurological: Negative for dizziness, weakness, light-headedness and headaches  Psychiatric/Behavioral: Negative for agitation  Current Outpatient Medications   Medication Sig Dispense Refill    Ascorbic Acid (vitamin C) 1000 MG tablet Take 1,000 mg by mouth daily      atorvastatin (LIPITOR) 10 mg tablet Take 1 tablet (10 mg total) by mouth daily 90 tablet 0    metFORMIN (GLUCOPHAGE) 1000 MG tablet Take 1 tablet (1,000 mg total) by mouth 2 (two) times a day with meals 90 tablet 0    omeprazole (PriLOSEC) 20 mg delayed release capsule Take 20 mg by mouth daily   ramipril (ALTACE) 10 MG capsule Take 1 capsule (10 mg total) by mouth daily 90 capsule 0    vitamin E 100 UNIT capsule Take 100 Units by mouth daily       No current facility-administered medications for this visit  Objective:    /80 (BP Location: Right arm, Patient Position: Sitting, Cuff Size: Large)   Pulse 76   Temp (!) 97 4 °F (36 3 °C) (Temporal)   Resp 14   Ht 5' 8" (1 727 m)   Wt 100 kg (221 lb)   SpO2 96%   BMI 33 60 kg/m²        Physical Exam  Vitals signs reviewed  Constitutional:       Appearance: He is well-developed  HENT:      Head: Normocephalic and atraumatic  Right Ear: External ear normal       Left Ear: External ear normal       Nose: Nose normal       Mouth/Throat:      Pharynx: No oropharyngeal exudate  Eyes:      General:         Right eye: No discharge  Left eye: No discharge  Neck:      Musculoskeletal: Normal range of motion and neck supple  Cardiovascular:      Rate and Rhythm: Normal rate and regular rhythm  Pulses: Normal pulses  Heart sounds: Normal heart sounds  Pulmonary:      Effort: Pulmonary effort is normal       Breath sounds: Normal breath sounds  No wheezing  Abdominal:      General: Bowel sounds are normal    Musculoskeletal:         General: No swelling  Skin:     General: Skin is warm  Findings: No erythema  Neurological:      Mental Status: He is alert  Mental status is at baseline     Psychiatric:         Behavior: Behavior normal                 Vira Granger MD

## 2021-04-20 DIAGNOSIS — E78.5 HYPERLIPIDEMIA, UNSPECIFIED HYPERLIPIDEMIA TYPE: ICD-10-CM

## 2021-04-21 DIAGNOSIS — E78.5 HYPERLIPIDEMIA, UNSPECIFIED HYPERLIPIDEMIA TYPE: ICD-10-CM

## 2021-04-21 RX ORDER — ATORVASTATIN CALCIUM 10 MG/1
TABLET, FILM COATED ORAL
Qty: 90 TABLET | Refills: 0 | Status: SHIPPED | OUTPATIENT
Start: 2021-04-21 | End: 2021-04-21 | Stop reason: SDUPTHER

## 2021-04-21 RX ORDER — ATORVASTATIN CALCIUM 10 MG/1
10 TABLET, FILM COATED ORAL DAILY
Qty: 90 TABLET | Refills: 0 | Status: SHIPPED | OUTPATIENT
Start: 2021-04-21 | End: 2022-02-03 | Stop reason: SDUPTHER

## 2021-05-24 DIAGNOSIS — I10 ESSENTIAL HYPERTENSION: ICD-10-CM

## 2021-05-24 RX ORDER — RAMIPRIL 10 MG/1
CAPSULE ORAL
Qty: 90 CAPSULE | Refills: 0 | Status: SHIPPED | OUTPATIENT
Start: 2021-05-24 | End: 2021-08-20

## 2021-05-24 RX ORDER — RAMIPRIL 10 MG/1
10 CAPSULE ORAL DAILY
Qty: 90 CAPSULE | Refills: 0 | OUTPATIENT
Start: 2021-05-24

## 2021-06-16 ENCOUNTER — OFFICE VISIT (OUTPATIENT)
Dept: FAMILY MEDICINE CLINIC | Facility: CLINIC | Age: 51
End: 2021-06-16

## 2021-06-16 VITALS
RESPIRATION RATE: 14 BRPM | BODY MASS INDEX: 33.74 KG/M2 | HEIGHT: 68 IN | OXYGEN SATURATION: 96 % | WEIGHT: 222.6 LBS | SYSTOLIC BLOOD PRESSURE: 118 MMHG | TEMPERATURE: 98 F | HEART RATE: 79 BPM | DIASTOLIC BLOOD PRESSURE: 80 MMHG

## 2021-06-16 DIAGNOSIS — E11.65 TYPE 2 DIABETES MELLITUS WITH HYPERGLYCEMIA, WITHOUT LONG-TERM CURRENT USE OF INSULIN (HCC): Primary | ICD-10-CM

## 2021-06-16 LAB — SL AMB POCT HEMOGLOBIN AIC: 6.4 (ref ?–6.5)

## 2021-06-16 PROCEDURE — 99212 OFFICE O/P EST SF 10 MIN: CPT | Performed by: FAMILY MEDICINE

## 2021-06-16 PROCEDURE — 83036 HEMOGLOBIN GLYCOSYLATED A1C: CPT | Performed by: FAMILY MEDICINE

## 2021-06-21 NOTE — PROGRESS NOTES
Assessment/Plan:    1  Type 2 diabetes mellitus with hyperglycemia, without long-term current use of insulin ([facility])  -     POCT hemoglobin A1c  -     sitaGLIPtin-metFORMIN (JANUMET)  MG per tablet; Take 1 tablet by mouth daily    A1c: improvement from 7 8 to 6 4  Continue with medication and diet adjustment  Precautions reviewed  Eye doctor recommended  pt is self pay and declines colonoscopy at this time  Return in about 3 months (around 9/16/2021), or if symptoms worsen or fail to improve  Subjective:      Patient ID: Patricia Solis is a 49 y o  male  Chief Complaint   Patient presents with    Diabetes       HPI  50 y/o male presents for DM check  Patient is taking sitagliptin-metformin  Patient has adjusted diet  Blood glucose level 120's  Patient is taking medications for cholesterol  Patient denies any chest pain or shortness of breath  The following portions of the patient's history were reviewed and updated as appropriate: allergies, current medications, past family history, past medical history, past social history, past surgical history and problem list     Review of Systems   Constitutional: Negative for fever  HENT: Negative for sore throat  Eyes: Negative for visual disturbance  Respiratory: Negative for cough and shortness of breath  Cardiovascular: Negative for chest pain  Gastrointestinal: Negative for abdominal pain, constipation, nausea and vomiting  Musculoskeletal: Negative for back pain  Neurological: Negative for dizziness, weakness, light-headedness and headaches  Psychiatric/Behavioral: Negative for agitation  Current Outpatient Medications   Medication Sig Dispense Refill    Ascorbic Acid (vitamin C) 1000 MG tablet Take 1,000 mg by mouth daily      atorvastatin (LIPITOR) 10 mg tablet Take 1 tablet (10 mg total) by mouth daily 90 tablet 0    omeprazole (PriLOSEC) 20 mg delayed release capsule Take 20 mg by mouth daily        ramipril (ALTACE) 10 MG capsule take 1 capsule by mouth once daily 90 capsule 0    sitaGLIPtin-metFORMIN (JANUMET)  MG per tablet Take 1 tablet by mouth daily      vitamin E 100 UNIT capsule Take 100 Units by mouth daily       No current facility-administered medications for this visit  Objective:    /80 (BP Location: Left arm, Patient Position: Sitting, Cuff Size: Large)   Pulse 79   Temp 98 °F (36 7 °C) (Temporal)   Resp 14   Ht 5' 8" (1 727 m)   Wt 101 kg (222 lb 9 6 oz)   SpO2 96%   BMI 33 85 kg/m²        Physical Exam  Constitutional:       Appearance: He is well-developed  HENT:      Head: Normocephalic and atraumatic  Right Ear: External ear normal       Left Ear: External ear normal       Nose: Nose normal       Mouth/Throat:      Pharynx: Oropharynx is clear  No oropharyngeal exudate  Eyes:      General:         Right eye: No discharge  Left eye: No discharge  Conjunctiva/sclera: Conjunctivae normal    Cardiovascular:      Rate and Rhythm: Normal rate and regular rhythm  Heart sounds: Normal heart sounds  No murmur heard  Pulmonary:      Effort: Pulmonary effort is normal  No respiratory distress  Breath sounds: Normal breath sounds  No wheezing  Abdominal:      General: Bowel sounds are normal  There is no distension  Palpations: Abdomen is soft  Tenderness: There is no abdominal tenderness  Musculoskeletal:         General: No deformity  Normal range of motion  Cervical back: Neck supple  Lymphadenopathy:      Cervical: No cervical adenopathy  Skin:     General: Skin is warm  Findings: No rash  Neurological:      Mental Status: He is alert and oriented to person, place, and time     Psychiatric:         Mood and Affect: Mood normal          Behavior: Behavior normal                 Ry Booker MD

## 2021-08-20 DIAGNOSIS — I10 ESSENTIAL HYPERTENSION: ICD-10-CM

## 2021-08-20 RX ORDER — RAMIPRIL 10 MG/1
CAPSULE ORAL
Qty: 90 CAPSULE | Refills: 0 | Status: SHIPPED | OUTPATIENT
Start: 2021-08-20 | End: 2021-11-19 | Stop reason: SDUPTHER

## 2021-08-20 NOTE — TELEPHONE ENCOUNTER
Dr Cabrera Henderson,    Patient's wife is calling for a refill on ramipril,  Pharmacy is [facility] in [place]

## 2021-11-19 DIAGNOSIS — I10 ESSENTIAL HYPERTENSION: ICD-10-CM

## 2021-11-19 RX ORDER — RAMIPRIL 10 MG/1
10 CAPSULE ORAL DAILY
Qty: 90 CAPSULE | Refills: 1 | Status: SHIPPED | OUTPATIENT
Start: 2021-11-19 | End: 2022-05-10 | Stop reason: SDUPTHER

## 2021-12-15 ENCOUNTER — OFFICE VISIT (OUTPATIENT)
Dept: FAMILY MEDICINE CLINIC | Facility: CLINIC | Age: 51
End: 2021-12-15

## 2021-12-15 VITALS
BODY MASS INDEX: 33.65 KG/M2 | SYSTOLIC BLOOD PRESSURE: 132 MMHG | DIASTOLIC BLOOD PRESSURE: 90 MMHG | HEIGHT: 68 IN | RESPIRATION RATE: 14 BRPM | HEART RATE: 66 BPM | WEIGHT: 222 LBS | TEMPERATURE: 97.7 F

## 2021-12-15 DIAGNOSIS — I10 ESSENTIAL HYPERTENSION: ICD-10-CM

## 2021-12-15 DIAGNOSIS — E11.65 TYPE 2 DIABETES MELLITUS WITH HYPERGLYCEMIA, WITHOUT LONG-TERM CURRENT USE OF INSULIN (HCC): Primary | ICD-10-CM

## 2021-12-15 DIAGNOSIS — R51.9 NONINTRACTABLE HEADACHE, UNSPECIFIED CHRONICITY PATTERN, UNSPECIFIED HEADACHE TYPE: ICD-10-CM

## 2021-12-15 LAB — SL AMB POCT HEMOGLOBIN AIC: 6.5 (ref ?–6.5)

## 2021-12-15 PROCEDURE — 83036 HEMOGLOBIN GLYCOSYLATED A1C: CPT | Performed by: FAMILY MEDICINE

## 2021-12-15 PROCEDURE — 99213 OFFICE O/P EST LOW 20 MIN: CPT | Performed by: FAMILY MEDICINE

## 2021-12-16 LAB
ALBUMIN SERPL-MCNC: 4.4 G/DL (ref 4–5)
ALBUMIN/GLOB SERPL: 1.6 {RATIO} (ref 1.2–2.2)
ALP SERPL-CCNC: 95 IU/L (ref 44–121)
ALT SERPL-CCNC: 60 IU/L (ref 0–44)
AST SERPL-CCNC: 38 IU/L (ref 0–40)
BILIRUB SERPL-MCNC: 0.7 MG/DL (ref 0–1.2)
BUN SERPL-MCNC: 19 MG/DL (ref 6–24)
BUN/CREAT SERPL: 20 (ref 9–20)
CALCIUM SERPL-MCNC: 8.9 MG/DL (ref 8.7–10.2)
CHLORIDE SERPL-SCNC: 103 MMOL/L (ref 96–106)
CO2 SERPL-SCNC: 22 MMOL/L (ref 20–29)
CREAT SERPL-MCNC: 0.97 MG/DL (ref 0.76–1.27)
GLOBULIN SER-MCNC: 2.7 G/DL (ref 1.5–4.5)
GLUCOSE SERPL-MCNC: ABNORMAL MG/DL
POTASSIUM SERPL-SCNC: ABNORMAL MMOL/L
PROT SERPL-MCNC: 7.1 G/DL (ref 6–8.5)
SL AMB EGFR AFRICAN AMERICAN: 105 ML/MIN/1.73
SL AMB EGFR NON AFRICAN AMERICAN: 91 ML/MIN/1.73
SODIUM SERPL-SCNC: 139 MMOL/L (ref 134–144)

## 2022-01-27 DIAGNOSIS — E11.65 TYPE 2 DIABETES MELLITUS WITH HYPERGLYCEMIA, WITHOUT LONG-TERM CURRENT USE OF INSULIN (HCC): ICD-10-CM

## 2022-01-28 ENCOUNTER — NURSE TRIAGE (OUTPATIENT)
Dept: OTHER | Facility: OTHER | Age: 52
End: 2022-01-28

## 2022-01-28 DIAGNOSIS — E11.65 TYPE 2 DIABETES MELLITUS WITH HYPERGLYCEMIA, WITHOUT LONG-TERM CURRENT USE OF INSULIN (HCC): ICD-10-CM

## 2022-01-29 NOTE — TELEPHONE ENCOUNTER
Regarding: Out of [facility]  ----- Message from Malaika Puentes sent at 1/28/2022  5:41 PM EST -----  "I am at the pharmacy, and they never received my prescription    I am completely out "    sitaGLIPtin-metFORMIN (LUBAUMET)  MG per tablet     [facility] [phone]

## 2022-01-29 NOTE — TELEPHONE ENCOUNTER
Reason for Disposition   [1] Prescription prescribed recently is not at pharmacy AND [2] triager has access to patient's EMR AND [3] prescription is recorded in the EMR    Answer Assessment - Initial Assessment Questions  1  NAME of MEDICATION: "What medicine are you calling about?"      Janument   2  QUESTION: "What is your question?" (e g , medication refill, side effect)      Rx is not at the pharmacy  3  PRESCRIBING HCP: "Who prescribed it?" Reason: if prescribed by specialist, call should be referred to that group  PCP    Rx set as a no print in computer  Rx resent for pt  Pt made aware      Protocols used: MEDICATION QUESTION CALL-ADULT-

## 2022-02-02 ENCOUNTER — TELEPHONE (OUTPATIENT)
Dept: FAMILY MEDICINE CLINIC | Facility: CLINIC | Age: 52
End: 2022-02-02

## 2022-02-02 NOTE — TELEPHONE ENCOUNTER
Pharmacy is currently out of of [address] [facility],[address]  Can you sent an alternative?

## 2022-02-03 DIAGNOSIS — E78.5 HYPERLIPIDEMIA, UNSPECIFIED HYPERLIPIDEMIA TYPE: ICD-10-CM

## 2022-02-03 RX ORDER — ATORVASTATIN CALCIUM 10 MG/1
10 TABLET, FILM COATED ORAL DAILY
Qty: 90 TABLET | Refills: 2 | Status: SHIPPED | OUTPATIENT
Start: 2022-02-03 | End: 2022-02-08 | Stop reason: SDUPTHER

## 2022-02-03 NOTE — TELEPHONE ENCOUNTER
Patient said the medication is very expensive and he does not have insurance now    He said he will wait for alittle no need to order anything new tc/cma

## 2022-02-08 ENCOUNTER — OFFICE VISIT (OUTPATIENT)
Dept: FAMILY MEDICINE CLINIC | Facility: CLINIC | Age: 52
End: 2022-02-08

## 2022-02-08 VITALS
TEMPERATURE: 97.9 F | WEIGHT: 226 LBS | HEIGHT: 68 IN | HEART RATE: 78 BPM | RESPIRATION RATE: 16 BRPM | DIASTOLIC BLOOD PRESSURE: 90 MMHG | SYSTOLIC BLOOD PRESSURE: 142 MMHG | BODY MASS INDEX: 34.25 KG/M2

## 2022-02-08 DIAGNOSIS — E78.5 HYPERLIPIDEMIA, UNSPECIFIED HYPERLIPIDEMIA TYPE: ICD-10-CM

## 2022-02-08 DIAGNOSIS — E11.65 TYPE 2 DIABETES MELLITUS WITH HYPERGLYCEMIA, WITHOUT LONG-TERM CURRENT USE OF INSULIN (HCC): Primary | ICD-10-CM

## 2022-02-08 DIAGNOSIS — I10 ESSENTIAL HYPERTENSION: ICD-10-CM

## 2022-02-08 PROCEDURE — 99213 OFFICE O/P EST LOW 20 MIN: CPT | Performed by: FAMILY MEDICINE

## 2022-02-08 RX ORDER — ATORVASTATIN CALCIUM 10 MG/1
10 TABLET, FILM COATED ORAL DAILY
Qty: 90 TABLET | Refills: 2 | Status: SHIPPED | OUTPATIENT
Start: 2022-02-08

## 2022-02-08 RX ORDER — AMLODIPINE BESYLATE 2.5 MG/1
2.5 TABLET ORAL DAILY
Qty: 30 TABLET | Refills: 5 | Status: SHIPPED | OUTPATIENT
Start: 2022-02-08 | End: 2022-03-08

## 2022-02-08 NOTE — PROGRESS NOTES
[facility]  DM Type 2 check  ASSESSMENT/PLAN  Ange Trejo is a 49 y o  male presents to the office for     Diagnoses and all orders for this visit:    Hyperlipidemia, unspecified hyperlipidemia type    Type 2 diabetes mellitus with hyperglycemia, without long-term current use of insulin (HCC)  -     sitaGLIPtin-metFORMIN (JANUMET)  MG per tablet; Take 1 tablet by mouth daily    Essential hypertension  -     amLODIPine (NORVASC) 2 5 mg tablet; Take 1 tablet (2 5 mg total) by mouth daily        1) Diabetes Type 2  - Controlled  -Most recent lab testing:   Results from last 6 Months   Lab Units 12/15/21  0923 12/15/21  0000   HEMOGLOBIN A1C  6 5  --    CREATININE mg/dL  --  0 97     Concerned about elevated sugar off of medications  5 day supply given until he travels to home country to have new supply  Understands risk of being off medications    -  today  - Diabetes education:Encourage the patient to continue lifestyle changes  2  HLD restarted on Statin  3  HTN add on amlodipine 2 5mg recommend monitoring  Adjustment of diet will bring the patient to normal levels  Hopefully can remove in future      No future appointments  Disposition: Return to the clinic in 1 month    BMI Counseling: Body mass index is 34 36 kg/m²  The BMI is above normal  Nutrition recommendations include decreasing portion sizes, decreasing fast food intake and consuming healthier snacks  Exercise recommendations include exercising 3-5 times per week  Rationale for BMI follow-up plan is due to patient being overweight or obese  SUBJECTIVE  CC: Blood Pressure Check and Medication Problem      HPI:  Ange Trejo is a 49 y o  male presenting to the office for Diabetes check with Chronic conditions check  Patient concerned  States that his blood pressures been very elevated with his home log  Has been feeling somewhat as source with a headache and pressure  Patient has been off of his medications of cholesterol secondary to bad effects that he read up on  But he was tolerating it well  Patient also has been out of his diabetic medications  Review of Systems   Constitutional: Negative for activity change, appetite change, chills, fatigue and fever  HENT: Negative for congestion  Respiratory: Negative for cough, chest tightness and shortness of breath  Cardiovascular: Negative for chest pain and leg swelling  Gastrointestinal: Negative for abdominal distention, abdominal pain, constipation, diarrhea, nausea and vomiting  Neurological: Positive for dizziness and headaches  All other systems reviewed and are negative  Historical Information   The patient history was reviewed as follows:    Past Medical History:   Diagnosis Date    Chronic allergic conjunctivitis     Resolved 2/2/2016     Diabetes mellitus ([facility] [address] )     GERD (gastroesophageal reflux disease)     Hypertension      Past Surgical History:   Procedure Laterality Date    EYE SURGERY      wxc  of ptergium of eye x 2     Family History   Problem Relation Age of Onset    Heart disease Mother     Heart disease Father       Social History   Social History     Substance and Sexual Activity   Alcohol Use No     Social History     Substance and Sexual Activity   Drug Use No     Social History     Tobacco Use   Smoking Status Never Smoker   Smokeless Tobacco Never Used       Medications:     Current Outpatient Medications:     Ascorbic Acid (vitamin C) 1000 MG tablet, Take 1,000 mg by mouth daily, Disp: , Rfl:     omeprazole (PriLOSEC) 20 mg delayed release capsule, Take 20 mg by mouth daily  , Disp: , Rfl:     ramipril (ALTACE) 10 MG capsule, Take 1 capsule (10 mg total) by mouth daily, Disp: 90 capsule, Rfl: 1    vitamin E 100 UNIT capsule, Take 100 Units by mouth daily, Disp: , Rfl:     amLODIPine (NORVASC) 2 5 mg tablet, Take 1 tablet (2 5 mg total) by mouth daily, Disp: 30 tablet, Rfl: 5    atorvastatin (LIPITOR) 10 mg tablet, Take 1 tablet (10 mg total) by mouth daily (Patient not taking: Reported on 2/8/2022 ), Disp: 90 tablet, Rfl: 2    sitaGLIPtin-metFORMIN (JANUMET)  MG per tablet, Take 1 tablet by mouth daily, Disp: 5 tablet, Rfl: 0  Allergies   Allergen Reactions    Aspirin        OBJECTIVE    Vitals:   Vitals:    02/08/22 1601   BP: 142/90   BP Location: Left arm   Patient Position: Sitting   Cuff Size: Adult   Pulse: 78   Resp: 16   Temp: 97 9 °F (36 6 °C)   TempSrc: Temporal   Weight: 103 kg (226 lb)   Height: 5' 8" (1 727 m)           Physical Exam  Vitals reviewed  Constitutional:       Appearance: He is well-developed  HENT:      Head: Normocephalic and atraumatic  Eyes:      Conjunctiva/sclera: Conjunctivae normal       Pupils: Pupils are equal, round, and reactive to light  Cardiovascular:      Rate and Rhythm: Normal rate and regular rhythm  Pulses: no weak pulses          Dorsalis pedis pulses are 2+ on the right side and 2+ on the left side  Posterior tibial pulses are 2+ on the right side and 2+ on the left side  Heart sounds: Normal heart sounds, S1 normal and S2 normal  No murmur heard  Pulmonary:      Effort: Pulmonary effort is normal  No respiratory distress  Breath sounds: Normal breath sounds  No wheezing  Musculoskeletal:         General: Normal range of motion  Cervical back: Normal range of motion and neck supple  Feet:      Right foot:      Skin integrity: No ulcer, skin breakdown, erythema, warmth, callus or dry skin  Left foot:      Skin integrity: No ulcer, skin breakdown, erythema, warmth, callus or dry skin  Skin:     General: Skin is warm  Neurological:      Mental Status: He is alert and oriented to person, place, and time  Psychiatric:         Speech: Speech normal          Behavior: Behavior normal          Thought Content:  Thought content normal          Judgment: Judgment normal  Patient's shoes and socks removed  Right Foot/Ankle   Right Foot Inspection  Skin Exam: skin normal and skin intact  No dry skin, no warmth, no callus, no erythema, no maceration, no abnormal color, no pre-ulcer, no ulcer and no callus  Toe Exam: ROM and strength within normal limits  No swelling    Sensory   Vibration: intact  Proprioception: intact  Monofilament testing: intact    Vascular  Capillary refills: < 3 seconds  The right DP pulse is 2+  The right PT pulse is 2+  Left Foot/Ankle  Left Foot Inspection  Skin Exam: skin normal and skin intact  No dry skin, no warmth, no erythema, no maceration, normal color, no pre-ulcer, no ulcer and no callus  Toe Exam: ROM and strength within normal limits  No swelling  Sensory   Vibration: intact  Proprioception: intact  Monofilament testing: intact    Vascular  Capillary refills: < 3 seconds  The left DP pulse is 2+  The left PT pulse is 2+       Assign Risk Category  No deformity present  No loss of protective sensation  No weak pulses  Risk: 0       Mariel Mackey MD  [address]

## 2022-02-09 ENCOUNTER — CLINICAL SUPPORT (OUTPATIENT)
Dept: FAMILY MEDICINE CLINIC | Facility: CLINIC | Age: 52
End: 2022-02-09

## 2022-02-09 DIAGNOSIS — Z11.52 ENCOUNTER FOR SCREENING FOR COVID-19: Primary | ICD-10-CM

## 2022-02-09 LAB
SARS-COV-2 AG UPPER RESP QL IA: NEGATIVE
VALID CONTROL: NORMAL

## 2022-02-09 PROCEDURE — 87811 SARS-COV-2 COVID19 W/OPTIC: CPT

## 2022-03-08 ENCOUNTER — OFFICE VISIT (OUTPATIENT)
Dept: FAMILY MEDICINE CLINIC | Facility: CLINIC | Age: 52
End: 2022-03-08

## 2022-03-08 VITALS
DIASTOLIC BLOOD PRESSURE: 80 MMHG | WEIGHT: 224 LBS | BODY MASS INDEX: 33.95 KG/M2 | SYSTOLIC BLOOD PRESSURE: 132 MMHG | TEMPERATURE: 98 F | HEIGHT: 68 IN | HEART RATE: 72 BPM | RESPIRATION RATE: 14 BRPM

## 2022-03-08 DIAGNOSIS — E78.5 HYPERLIPIDEMIA, UNSPECIFIED HYPERLIPIDEMIA TYPE: ICD-10-CM

## 2022-03-08 DIAGNOSIS — I10 ESSENTIAL HYPERTENSION: ICD-10-CM

## 2022-03-08 DIAGNOSIS — E11.65 TYPE 2 DIABETES MELLITUS WITH HYPERGLYCEMIA, WITHOUT LONG-TERM CURRENT USE OF INSULIN (HCC): Primary | ICD-10-CM

## 2022-03-08 LAB — SL AMB POCT HEMOGLOBIN AIC: 7.1 (ref ?–6.5)

## 2022-03-08 PROCEDURE — 83036 HEMOGLOBIN GLYCOSYLATED A1C: CPT | Performed by: FAMILY MEDICINE

## 2022-03-08 PROCEDURE — 99213 OFFICE O/P EST LOW 20 MIN: CPT | Performed by: FAMILY MEDICINE

## 2022-03-10 NOTE — PROGRESS NOTES
Genesis Christopher 1970 male MRN: [id number]    FAMILY PRACTICE OFFICE VISIT  [facility] - [address]      ASSESSMENT/PLAN  Genesis Christopher is a 49 y o  male presents to the office for    Diagnoses and all orders for this visit:    Type 2 diabetes mellitus with hyperglycemia, without long-term current use of insulin ([address] )  -     POCT hemoglobin A1c    Essential hypertension    Hyperlipidemia, unspecified hyperlipidemia type       [place] his blood pressure and his diabetes has been better managed  Continue on all medications with no changes  Future Appointments   Date Time Provider Norman Craft   9/8/2022  3:30 PM Janelle De La Rosa MD [facility]          SUBJECTIVE  CC: Diabetes and Foot Swelling (due to medication)      HPI:  Genesis Christopher is a 49 y o  male who presents for an acute appointment  Patient stop taking the amlodipine 2 5 given foot swelling  States ever since he has been taking his normal diabetic medications his BP has been stable  Denies any acute concerns today  Review of Systems   Constitutional: Negative for activity change, appetite change, chills, fatigue and fever  HENT: Negative for congestion  Respiratory: Negative for cough, chest tightness and shortness of breath  Cardiovascular: Negative for chest pain and leg swelling  Gastrointestinal: Negative for abdominal distention, abdominal pain, constipation, diarrhea, nausea and vomiting  All other systems reviewed and are negative        Historical Information   The patient history was reviewed as follows:  Past Medical History:   Diagnosis Date    Chronic allergic conjunctivitis     Resolved 2/2/2016     Diabetes mellitus ([address] )     GERD (gastroesophageal reflux disease)     Hypertension          Medications:     Current Outpatient Medications:     Ascorbic Acid (vitamin C) 1000 MG tablet, Take 1,000 mg by mouth daily, Disp: , Rfl:     atorvastatin (LIPITOR) 10 mg tablet, Take 1 tablet (10 mg total) by mouth daily, Disp: 90 tablet, Rfl: 2    omeprazole (PriLOSEC) 20 mg delayed release capsule, Take 20 mg by mouth daily  , Disp: , Rfl:     ramipril (ALTACE) 10 MG capsule, Take 1 capsule (10 mg total) by mouth daily, Disp: 90 capsule, Rfl: 1    sitaGLIPtin-metFORMIN (JANUMET)  MG per tablet, Take 1 tablet by mouth daily, Disp: 5 tablet, Rfl: 0    vitamin E 100 UNIT capsule, Take 100 Units by mouth daily, Disp: , Rfl:     Allergies   Allergen Reactions    Amlodipine Swelling    Aspirin        OBJECTIVE  Vitals:   Vitals:    03/08/22 1546   BP: 132/80   BP Location: Left arm   Patient Position: Sitting   Cuff Size: Adult   Pulse: 72   Resp: 14   Temp: 98 °F (36 7 °C)   TempSrc: Temporal   Weight: 102 kg (224 lb)   Height: 5' 8" (1 727 m)         Physical Exam  Vitals reviewed  Constitutional:       Appearance: He is well-developed  HENT:      Head: Normocephalic and atraumatic  Eyes:      Conjunctiva/sclera: Conjunctivae normal       Pupils: Pupils are equal, round, and reactive to light  Cardiovascular:      Rate and Rhythm: Normal rate and regular rhythm  Heart sounds: Normal heart sounds, S1 normal and S2 normal  No murmur heard  Pulmonary:      Effort: Pulmonary effort is normal  No respiratory distress  Breath sounds: Normal breath sounds  No wheezing  Musculoskeletal:         General: Normal range of motion  Cervical back: Normal range of motion and neck supple  Skin:     General: Skin is warm  Neurological:      Mental Status: He is alert and oriented to person, place, and time  Psychiatric:         Speech: Speech normal          Behavior: Behavior normal          Thought Content:  Thought content normal          Judgment: Judgment normal                     Wero Carroll MD,   [facility]  3/9/2022

## 2022-05-10 DIAGNOSIS — I10 ESSENTIAL HYPERTENSION: ICD-10-CM

## 2022-05-10 RX ORDER — RAMIPRIL 10 MG/1
10 CAPSULE ORAL DAILY
Qty: 90 CAPSULE | Refills: 0 | Status: SHIPPED | OUTPATIENT
Start: 2022-05-10 | End: 2022-08-09 | Stop reason: SDUPTHER

## 2022-06-15 ENCOUNTER — OFFICE VISIT (OUTPATIENT)
Dept: FAMILY MEDICINE CLINIC | Facility: CLINIC | Age: 52
End: 2022-06-15

## 2022-06-15 VITALS
WEIGHT: 205.4 LBS | BODY MASS INDEX: 31.13 KG/M2 | RESPIRATION RATE: 18 BRPM | OXYGEN SATURATION: 97 % | HEIGHT: 68 IN | HEART RATE: 76 BPM | SYSTOLIC BLOOD PRESSURE: 106 MMHG | TEMPERATURE: 97.9 F | DIASTOLIC BLOOD PRESSURE: 76 MMHG

## 2022-06-15 DIAGNOSIS — H65.91 MIDDLE EAR EFFUSION, RIGHT: ICD-10-CM

## 2022-06-15 DIAGNOSIS — Z91.09 ENVIRONMENTAL ALLERGIES: ICD-10-CM

## 2022-06-15 DIAGNOSIS — I10 ESSENTIAL HYPERTENSION: Primary | ICD-10-CM

## 2022-06-15 PROCEDURE — 99213 OFFICE O/P EST LOW 20 MIN: CPT | Performed by: NURSE PRACTITIONER

## 2022-06-15 NOTE — PROGRESS NOTES
Assessment/Plan:    1  Essential hypertension    2  Environmental allergies    3  Middle ear effusion, right    The case discussed with patient using patient centered shared decision making  The patient was counseled regarding instructions for management,-- risk factor reductions,-- prognosis,-- impressions,-- risks and benefits of treatment options,-- importance of compliance with treatment  I have reviewed the instructions with the patient, answering all questions to his satisfaction  Exam reveals normal blood pressure  Normal exam except right middle ear effusion  Cont current rx  Trial of flonase  Drink at least 60 oz water/day  More after hot day  Cont bp monitoring and records  Consider cardio referral  Recheck in 4 weeks or sooner for escalating problem        There are no Patient Instructions on file for this visit  No follow-ups on file  Subjective:      Patient ID: Leeanna Matthew is a 47 y o  male  Chief Complaint   Patient presents with    Hypertension    Dizziness       Here for bp check  Had elevated reading x1 yesterday-130/104 on home machine    Having a flare of seasonal allergies  Not treating  Works outside as   Has been hot lately  Not drinking enough water  C/o sl dizziness when bending over    Denies chest pain, shortness of breath, palps, syncope, headache      The following portions of the patient's history were reviewed and updated as appropriate: allergies, current medications, past family history, past medical history, past social history, past surgical history and problem list     Review of Systems   Constitutional: Negative  HENT: Positive for congestion and postnasal drip  Respiratory: Negative  Cardiovascular: Negative  Neurological: Positive for dizziness  Negative for weakness and headaches           Current Outpatient Medications   Medication Sig Dispense Refill    Ascorbic Acid (vitamin C) 1000 MG tablet Take 1,000 mg by mouth daily      atorvastatin (LIPITOR) 10 mg tablet Take 1 tablet (10 mg total) by mouth daily 90 tablet 2    omeprazole (PriLOSEC) 20 mg delayed release capsule Take 20 mg by mouth daily   ramipril (ALTACE) 10 MG capsule Take 1 capsule (10 mg total) by mouth daily 90 capsule 0    sitaGLIPtin-metFORMIN (JANUMET)  MG per tablet Take 1 tablet by mouth daily 5 tablet 0    vitamin E 100 UNIT capsule Take 100 Units by mouth daily       No current facility-administered medications for this visit  Objective:    /76   Pulse 76   Temp 97 9 °F (36 6 °C)   Resp 18   Ht 5' 8" (1 727 m)   Wt 93 2 kg (205 lb 6 4 oz)   SpO2 97%   BMI 31 23 kg/m²        Physical Exam  Vitals and nursing note reviewed  Constitutional:       General: He is not in acute distress  Appearance: Normal appearance  HENT:      Right Ear: A middle ear effusion is present  Neck:      Thyroid: No thyromegaly  Vascular: No carotid bruit  Cardiovascular:      Rate and Rhythm: Normal rate and regular rhythm  Pulses: Normal pulses  Heart sounds: Normal heart sounds  Pulmonary:      Effort: Pulmonary effort is normal       Breath sounds: Normal breath sounds  Lymphadenopathy:      Cervical: No cervical adenopathy  Neurological:      General: No focal deficit present  Mental Status: He is alert                  Mickel Epley, [address]

## 2022-06-28 ENCOUNTER — CONSULT (OUTPATIENT)
Dept: CARDIOLOGY CLINIC | Facility: CLINIC | Age: 52
End: 2022-06-28

## 2022-06-28 VITALS
WEIGHT: 216 LBS | SYSTOLIC BLOOD PRESSURE: 132 MMHG | HEIGHT: 68 IN | OXYGEN SATURATION: 96 % | TEMPERATURE: 97.6 F | BODY MASS INDEX: 32.74 KG/M2 | DIASTOLIC BLOOD PRESSURE: 90 MMHG | HEART RATE: 72 BPM

## 2022-06-28 DIAGNOSIS — R42 DIZZINESS: ICD-10-CM

## 2022-06-28 DIAGNOSIS — E78.5 HYPERLIPIDEMIA, UNSPECIFIED HYPERLIPIDEMIA TYPE: ICD-10-CM

## 2022-06-28 DIAGNOSIS — I10 ESSENTIAL HYPERTENSION: Primary | ICD-10-CM

## 2022-06-28 PROCEDURE — 93000 ELECTROCARDIOGRAM COMPLETE: CPT | Performed by: INTERNAL MEDICINE

## 2022-06-28 PROCEDURE — 99243 OFF/OP CNSLTJ NEW/EST LOW 30: CPT | Performed by: INTERNAL MEDICINE

## 2022-06-28 NOTE — PROGRESS NOTES
Consultation - Cardiology Office  [facility]  Chrissy Benjamin 47 y o  male MRN: [id number]  : 1970  Unit/Bed#:  Encounter: [id number]      ASSESSMENT:  Hypertension  BP today 132/90 mmHg with heart rate of 72 per minute  on ramipril 10 mg    Paroxysmal dizziness  Occurred when patient was working outside on a warm day    Type 2 diabetes mellitus    Dyslipidemia  2021 LDL 40, , HDL 29  On atorvastatin 10 mg      RECOMMENDATIONS:  Advised on low-salt diet  Avoid dehydration  Echocardiogram  48 hour Holter monitor      Thank you for your consultation  If you have any question please call me at [phone]      Primary Care Physician Requesting Consult: Patricia Roca MD      Reason for Consult / Principal Problem:  Hypertension and dizziness        HPI :     Chrissy Benjamin is a 47y o  year old male who was referred by primary care doctor for evaluation and management of hypertension and dizziness  According to the patient last week on a hot day when he was working outside in the sun pulling beats and he stood up he got dizzy  He has not had any recurrence of the dizziness  He also says that his blood pressure was elevated although in the chart documentation systolic BP are 455 and 887 without any orthostasis  Patient admits to having a high salt intake  He denies any other cardiac symptoms  He has underlying diabetes  According to him his brother passed away from high blood pressure    Review of Systems   Neurological: Positive for dizziness  All other systems reviewed and are negative        Historical Information   Past Medical History:   Diagnosis Date    Chronic allergic conjunctivitis     Resolved 2016     Diabetes mellitus ([address] )     GERD (gastroesophageal reflux disease)     Hypertension      Past Surgical History:   Procedure Laterality Date    EYE SURGERY      wxc  of ptergium of eye x 2     Social History     Substance and Sexual Activity Alcohol Use No     Social History     Substance and Sexual Activity   Drug Use No     Social History     Tobacco Use   Smoking Status Never Smoker   Smokeless Tobacco Never Used     Family History:   Family History   Problem Relation Age of Onset    Heart disease Mother     Heart disease Father        Meds/Allergies     Allergies   Allergen Reactions    Amlodipine Swelling    Aspirin        Current Outpatient Medications:     Ascorbic Acid (vitamin C) 1000 MG tablet, Take 1,000 mg by mouth daily, Disp: , Rfl:     atorvastatin (LIPITOR) 10 mg tablet, Take 1 tablet (10 mg total) by mouth daily, Disp: 90 tablet, Rfl: 2    omeprazole (PriLOSEC) 20 mg delayed release capsule, Take 20 mg by mouth daily  , Disp: , Rfl:     ramipril (ALTACE) 10 MG capsule, Take 1 capsule (10 mg total) by mouth daily, Disp: 90 capsule, Rfl: 0    sitaGLIPtin-metFORMIN (JANUMET)  MG per tablet, Take 1 tablet by mouth daily, Disp: 5 tablet, Rfl: 0    vitamin E 100 UNIT capsule, Take 100 Units by mouth daily, Disp: , Rfl:     Vitals: Blood pressure 132/90, pulse 72, temperature 97 6 °F (36 4 °C), height 5' 8" (1 727 m), weight 98 kg (216 lb), SpO2 96 %  ?  Body mass index is 32 84 kg/m²  Vitals:    06/28/22 1031   Weight: 98 kg (216 lb)     BP Readings from Last 3 Encounters:   06/28/22 132/90   06/15/22 106/76   03/08/22 132/80       PHYSICAL EXAMINATION:  Neurologic:  Alert & oriented x 3, no new focal deficits, Not in any acute distress,  Constitutional:  Overweight  Eyes:  Pupil equal and reacting to light, conjunctiva normal, No JVP, No LNP   HENT:  Atraumatic, oropharynx moist, Neck- normal range of motion, no tenderness,  Neck supple   Respiratory:  Bilateral air entry, mostly clear to auscultation  Cardiovascular: S1-S2 regular with a I/VI systolic murmur   GI:  Soft, nondistended, normal bowel sounds, nontender, no hepatosplenomegaly appreciated  Musculoskeletal: no tenderness, no deformities     Skin:  Well hydrated, no rash   Lymphatic:  No lymphadenopathy noted   Extremities:  No edema and distal pulses are present    Diagnostic Studies Review Cardio:      EKG:  Normal sinus rhythm, heart rate 72 per minute    Cardiac testing:   No results found for this or any previous visit  Imaging:  Chest X-Ray:   No Chest XR results available for this patient  CT-scan of the chest:     No CTA results available for this patient  Lab Review   Lab Results   Component Value Date    WBC 7 00 09/24/2019    HGB 15 4 09/24/2019    HCT 48 0 09/24/2019    MCV 92 09/24/2019    RDW 12 1 09/24/2019     09/24/2019     BMP:  Lab Results   Component Value Date    SODIUM 139 12/15/2021    K CANCELED 12/15/2021     12/15/2021    CO2 22 12/15/2021    BUN 19 12/15/2021    CREATININE 0 97 12/15/2021    GLUC CANCELED 12/15/2021    GLUF 131 (H) 09/24/2019    CALCIUM 9 1 03/17/2021    EGFR 97 03/17/2021     LFT:  Lab Results   Component Value Date    AST 38 12/15/2021    ALT 60 (H) 12/15/2021    ALKPHOS 113 03/17/2021    TP 7 1 12/15/2021    ALB 4 4 12/15/2021      Lab Results   Component Value Date    FKT6HGZKDPUJ 1 659 09/24/2019     No components found for: [facility]  Lab Results   Component Value Date    HGBA1C 7 1 (A) 03/08/2022     Lipid Profile:   Lab Results   Component Value Date    CHOLESTEROL 91 03/17/2021    HDL 29 (L) 03/17/2021    LDLCALC 40 03/17/2021    TRIG 109 03/17/2021     Lab Results   Component Value Date    CHOLESTEROL 91 03/17/2021    CHOLESTEROL 183 12/16/2020     Lab Results   Component Value Date    TROPONINI <0 02 06/23/2018     No results found for: NTBNP   No results found for this or any previous visit (from the past 672 hour(s))  Dr Roger Paredes MD, [facility]      "This note has been constructed using a voice recognition system  Therefore there may be syntax, spelling, and/or grammatical errors   Please call if you have any questions  "

## 2022-07-11 ENCOUNTER — HOSPITAL ENCOUNTER (OUTPATIENT)
Dept: NON INVASIVE DIAGNOSTICS | Facility: HOSPITAL | Age: 52
Discharge: HOME | End: 2022-07-11
Attending: INTERNAL MEDICINE

## 2022-07-11 DIAGNOSIS — R42 DIZZINESS: ICD-10-CM

## 2022-07-11 PROCEDURE — 93226 XTRNL ECG REC<48 HR SCAN A/R: CPT

## 2022-07-11 PROCEDURE — 93225 XTRNL ECG REC<48 HRS REC: CPT

## 2022-07-15 PROCEDURE — 93227 XTRNL ECG REC<48 HR R&I: CPT | Performed by: INTERNAL MEDICINE

## 2022-07-19 ENCOUNTER — TELEPHONE (OUTPATIENT)
Dept: CARDIOLOGY CLINIC | Facility: CLINIC | Age: 52
End: 2022-07-19

## 2022-07-19 NOTE — TELEPHONE ENCOUNTER
Patient called back and result were given as instructed by doctor in [address] [facility]

## 2022-07-19 NOTE — TELEPHONE ENCOUNTER
----- Message from Marna Fabry, MD sent at 7/16/2022 10:45 AM EDT -----  Please call and inform patient that the Holter monitor was reviewed  This did not reveal any significant abnormality or arrhythmia  The symptoms correlated with normal rhythm and no abnormality on the monitor  No immediate treatment or action is needed    Will discuss further at next office visit

## 2022-08-09 DIAGNOSIS — I10 ESSENTIAL HYPERTENSION: ICD-10-CM

## 2022-08-09 RX ORDER — RAMIPRIL 10 MG/1
10 CAPSULE ORAL DAILY
Qty: 90 CAPSULE | Refills: 2 | Status: SHIPPED | OUTPATIENT
Start: 2022-08-09

## 2023-05-22 DIAGNOSIS — I10 ESSENTIAL HYPERTENSION: ICD-10-CM

## 2023-05-23 RX ORDER — RAMIPRIL 10 MG/1
CAPSULE ORAL
Qty: 90 CAPSULE | Refills: 2 | Status: SHIPPED | OUTPATIENT
Start: 2023-05-23

## 2023-07-03 ENCOUNTER — OFFICE VISIT (OUTPATIENT)
Dept: FAMILY MEDICINE CLINIC | Facility: CLINIC | Age: 53
End: 2023-07-03

## 2023-07-03 VITALS
HEIGHT: 67 IN | TEMPERATURE: 98.2 F | HEART RATE: 80 BPM | DIASTOLIC BLOOD PRESSURE: 80 MMHG | BODY MASS INDEX: 37.67 KG/M2 | SYSTOLIC BLOOD PRESSURE: 140 MMHG | RESPIRATION RATE: 16 BRPM | WEIGHT: 240 LBS

## 2023-07-03 DIAGNOSIS — E11.65 TYPE 2 DIABETES MELLITUS WITH HYPERGLYCEMIA, WITHOUT LONG-TERM CURRENT USE OF INSULIN (HCC): Primary | ICD-10-CM

## 2023-07-03 DIAGNOSIS — E78.5 HYPERLIPIDEMIA, UNSPECIFIED HYPERLIPIDEMIA TYPE: ICD-10-CM

## 2023-07-03 LAB — SL AMB POCT HEMOGLOBIN AIC: 5.9 (ref ?–6.5)

## 2023-07-03 PROCEDURE — 99213 OFFICE O/P EST LOW 20 MIN: CPT | Performed by: FAMILY MEDICINE

## 2023-07-03 PROCEDURE — 83036 HEMOGLOBIN GLYCOSYLATED A1C: CPT | Performed by: FAMILY MEDICINE

## 2023-07-03 NOTE — PROGRESS NOTES
[address] [facility]    NAME: Maureen Hawthorne  AGE: 47 y.o. SEX: male  : 1970     DATE: 2023     Assessment and Plan:     Problem List Items Addressed This Visit        Endocrine    Type 2 diabetes mellitus with hyperglycemia, without long-term current use of insulin ([address]) - Primary    Relevant Orders    POCT hemoglobin A1c (Completed)       Other    Hyperlipidemia    Relevant Orders    Lipid panel    Comprehensive metabolic panel   Diabetes currently improving  patient not fasting today therefore unable to do blood work for him    Immunizations and preventive care screenings were discussed with patient today. Appropriate education was printed on patient's after visit summary. No follow-ups on file. Chief Complaint:     Chief Complaint   Patient presents with   • Physical Exam      History of Present Illness:     Adult Annual Physical   Patient here for a comprehensive physical exam.  Doing well patient states he is taking his medication as prescribed without any difficulty. Diet and Physical Activity  Diet/Nutrition: well balanced diet. Exercise: walking and Exercise at work, . Depression Screening  PHQ-2/9 Depression Screening    Little interest or pleasure in doing things: 0 - not at all  Feeling down, depressed, or hopeless: 0 - not at all  PHQ-2 Score: 0  PHQ-2 Interpretation: Negative depression screen       General Health  Sleep: sleeps well. Hearing: normal - bilateral.  Vision: no vision problems and Blurry vision. Review of Systems:     Review of Systems   Constitutional: Negative for activity change, appetite change, chills, fatigue and fever. HENT: Negative for congestion. Respiratory: Negative for cough, chest tightness and shortness of breath. Cardiovascular: Negative for chest pain and leg swelling.    Gastrointestinal: Negative for abdominal distention, abdominal pain, constipation, diarrhea, nausea and vomiting. All other systems reviewed and are negative. Past Medical History:     Past Medical History:   Diagnosis Date   • Chronic allergic conjunctivitis     Resolved 2/2/2016    • Diabetes mellitus ([address])    • GERD (gastroesophageal reflux disease)    • Hypertension       Past Surgical History:     Past Surgical History:   Procedure Laterality Date   • EYE SURGERY      wxc. of ptergium of eye x 2      Family History:     Family History   Problem Relation Age of Onset   • Heart disease Mother    • Heart disease Father       Social History:     Social History     Socioeconomic History   • Marital status: /Civil Union     Spouse name: None   • Number of children: None   • Years of education: None   • Highest education level: None   Occupational History   • None   Tobacco Use   • Smoking status: Never   • Smokeless tobacco: Never   Substance and Sexual Activity   • Alcohol use: No   • Drug use: No   • Sexual activity: None   Other Topics Concern   • None   Social History Narrative    Daily coffee consumption (2 cups/day)      Social Determinants of Health     Financial Resource Strain: Not on file   Food Insecurity: Not on file   Transportation Needs: Not on file   Physical Activity: Not on file   Stress: Not on file   Social Connections: Not on file   Intimate Partner Violence: Not on file   Housing Stability: Not on file      Current Medications:     Current Outpatient Medications   Medication Sig Dispense Refill   • Ascorbic Acid (vitamin C) 1000 MG tablet Take 1,000 mg by mouth daily     • omeprazole (PriLOSEC) 20 mg delayed release capsule Take 20 mg by mouth daily.      • ramipril (ALTACE) 10 MG capsule take 1 capsule by mouth once daily 90 capsule 2   • sitaGLIPtin-metFORMIN (JANUMET)  MG per tablet Take 1 tablet by mouth daily 5 tablet 0   • vitamin E 100 UNIT capsule Take 100 Units by mouth daily       No current facility-administered medications for this visit. Allergies: Allergies   Allergen Reactions   • Amlodipine Swelling   • Aspirin    • Statins Other (See Comments)     Brain fog        Physical Exam:     /80 (BP Location: Left arm, Patient Position: Sitting, Cuff Size: Large)   Pulse 80   Temp 98.2 °F (36.8 °C)   Resp 16   Ht 5' 7" (1.702 m)   Wt 109 kg (240 lb)   BMI 37.59 kg/m²     Physical Exam  Vitals reviewed. Constitutional:       Appearance: He is well-developed. HENT:      Head: Normocephalic and atraumatic. Right Ear: External ear normal.      Left Ear: External ear normal.      Nose: Nose normal.   Eyes:      Conjunctiva/sclera: Conjunctivae normal.      Pupils: Pupils are equal, round, and reactive to light. Cardiovascular:      Rate and Rhythm: Normal rate and regular rhythm. Heart sounds: Normal heart sounds. Pulmonary:      Effort: Pulmonary effort is normal.      Breath sounds: Normal breath sounds. No wheezing. Abdominal:      General: Bowel sounds are normal. There is no distension. Palpations: Abdomen is soft. There is no mass. Tenderness: There is no abdominal tenderness. Genitourinary:     Penis: Normal.    Musculoskeletal:         General: No tenderness. Normal range of motion. Cervical back: Normal range of motion and neck supple. Skin:     General: Skin is warm. Capillary Refill: Capillary refill takes less than 2 seconds. Neurological:      Mental Status: He is alert and oriented to person, place, and time. Cranial Nerves: No cranial nerve deficit. Sensory: No sensory deficit. Motor: No abnormal muscle tone. Coordination: Coordination normal.      Deep Tendon Reflexes: Reflexes normal.   Psychiatric:         Behavior: Behavior normal.         Thought Content:  Thought content normal.         Judgment: Judgment normal.          Jeremy Ruby MD  [address] [place] [address]

## 2024-01-29 DIAGNOSIS — I10 ESSENTIAL HYPERTENSION: ICD-10-CM

## 2024-01-29 RX ORDER — RAMIPRIL 10 MG/1
10 CAPSULE ORAL DAILY
Qty: 90 CAPSULE | Refills: 2 | Status: SHIPPED | OUTPATIENT
Start: 2024-01-29

## 2024-01-29 NOTE — TELEPHONE ENCOUNTER
Patients spouse called because he needs a refill for ramipril. She said has a a few left but he is going out of town next week and she wants to make sure he has enough by then.

## 2024-11-06 DIAGNOSIS — I10 ESSENTIAL HYPERTENSION: ICD-10-CM

## 2024-11-07 RX ORDER — RAMIPRIL 10 MG/1
10 CAPSULE ORAL DAILY
Qty: 90 CAPSULE | Refills: 0 | Status: SHIPPED | OUTPATIENT
Start: 2024-11-07

## 2024-12-09 ENCOUNTER — OFFICE VISIT (OUTPATIENT)
Dept: FAMILY MEDICINE CLINIC | Facility: CLINIC | Age: 54
End: 2024-12-09

## 2024-12-09 VITALS
BODY MASS INDEX: 34.44 KG/M2 | RESPIRATION RATE: 16 BRPM | HEART RATE: 75 BPM | TEMPERATURE: 98.2 F | SYSTOLIC BLOOD PRESSURE: 140 MMHG | WEIGHT: 219.4 LBS | DIASTOLIC BLOOD PRESSURE: 81 MMHG | OXYGEN SATURATION: 95 % | HEIGHT: 67 IN

## 2024-12-09 DIAGNOSIS — E11.65 TYPE 2 DIABETES MELLITUS WITH HYPERGLYCEMIA, WITHOUT LONG-TERM CURRENT USE OF INSULIN (HCC): ICD-10-CM

## 2024-12-09 DIAGNOSIS — I10 ESSENTIAL HYPERTENSION: Primary | ICD-10-CM

## 2024-12-09 DIAGNOSIS — E11.9 TYPE 2 DIABETES MELLITUS WITHOUT COMPLICATION, WITHOUT LONG-TERM CURRENT USE OF INSULIN (HCC): ICD-10-CM

## 2024-12-09 DIAGNOSIS — E78.5 HYPERLIPIDEMIA, UNSPECIFIED HYPERLIPIDEMIA TYPE: ICD-10-CM

## 2024-12-09 PROCEDURE — 99213 OFFICE O/P EST LOW 20 MIN: CPT | Performed by: FAMILY MEDICINE

## 2024-12-09 RX ORDER — HYDROCHLOROTHIAZIDE 12.5 MG/1
12.5 TABLET ORAL DAILY
Qty: 30 TABLET | Refills: 0 | Status: SHIPPED | OUTPATIENT
Start: 2024-12-09

## 2024-12-09 RX ORDER — RAMIPRIL 10 MG/1
10 CAPSULE ORAL DAILY
Qty: 90 CAPSULE | Refills: 0 | Status: SHIPPED | OUTPATIENT
Start: 2024-12-09

## 2024-12-09 NOTE — PROGRESS NOTES
Navdeep Brewer 1970 male MRN: [id number]    [facility] PRACTICE OFFICE VISIT  [facility] - [facility]      ASSESSMENT/PLAN  Navdeep Brewer is a 54 y.o. male presents to the office for    Diagnoses and all orders for this visit:    Essential hypertension  -     ramipril (ALTACE) 10 MG capsule; Take 1 capsule (10 mg total) by mouth daily  -     hydroCHLOROthiazide 12.5 mg tablet; Take 1 tablet (12.5 mg total) by mouth daily    Type 2 diabetes mellitus with hyperglycemia, without long-term current use of insulin (HCC)  -     metFORMIN (GLUCOPHAGE) 1000 MG tablet; Take 1 tablet (1,000 mg total) by mouth 2 (two) times a day with meals  -     Comprehensive metabolic panel  -     CBC and differential  -     Hemoglobin A1C  -     Albumin / creatinine urine ratio    Hyperlipidemia, unspecified hyperlipidemia type  -     Lipid panel    Type 2 diabetes mellitus without complication, without long-term current use of insulin (HCC)    Spoke to the patient his blood pressure is elevated today the first reading was 170/82.  Recommend increasing blood pressure medications with adding on amlodipine 2.5 mg.  Advised to take these medications together.  And keep a blood pressure log and drop it off in a week.    All blood work has been sent out to [facility] today      Restarted on metformin at 1000 mg twice a day      Depression Screening and Follow-up Plan: Patient was screened for depression during today's encounter. They screened negative with a PHQ-2 score of 0.    Tobacco Cessation Counseling: Tobacco cessation counseling was provided. The patient is sincerely urged to quit consumption of tobacco. He is not ready to quit tobacco.           No future appointments.         SUBJECTIVE  CC: Follow up      HPI:  Navdeep Brewer is a 54 y.o. male who presents for an follow-up appointment.  Patient has a history of type 2 diabetes states he has been out of his metformin for 2 weeks.  Patient used to take it  and get treatment in [place] but now is wanting to get everything performed here given that he is not traveling so much.  Patient denies any concerns except for mild headache that comes and goes and thinks its blood pressure related states that his blood pressure readings are always elevated at home    Review of Systems   Constitutional:  Negative for activity change, appetite change, chills, fatigue and fever.   HENT:  Negative for congestion.    Respiratory:  Negative for cough, chest tightness and shortness of breath.    Cardiovascular:  Negative for chest pain and leg swelling.   Gastrointestinal:  Negative for abdominal distention, abdominal pain, constipation, diarrhea, nausea and vomiting.   Neurological:  Positive for headaches.   All other systems reviewed and are negative.      Historical Information   The patient history was reviewed as follows:  Past Medical History:   Diagnosis Date    Chronic allergic conjunctivitis     Resolved 2/2/2016     Diabetes mellitus (HCC)     GERD (gastroesophageal reflux disease)     Hypertension          Medications:     Current Outpatient Medications:     hydroCHLOROthiazide 12.5 mg tablet, Take 1 tablet (12.5 mg total) by mouth daily, Disp: 30 tablet, Rfl: 0    metFORMIN (GLUCOPHAGE) 1000 MG tablet, Take 1 tablet (1,000 mg total) by mouth 2 (two) times a day with meals, Disp: 180 tablet, Rfl: 1    ramipril (ALTACE) 10 MG capsule, Take 1 capsule (10 mg total) by mouth daily, Disp: 90 capsule, Rfl: 0    Ascorbic Acid (vitamin C) 1000 MG tablet, Take 1,000 mg by mouth daily, Disp: , Rfl:     omeprazole (PriLOSEC) 20 mg delayed release capsule, Take 20 mg by mouth daily., Disp: , Rfl:     vitamin E 100 UNIT capsule, Take 100 Units by mouth daily, Disp: , Rfl:     Allergies   Allergen Reactions    Amlodipine Swelling    Aspirin     Statins Other (See Comments)     Brain fog         OBJECTIVE  Vitals:   Vitals:    12/09/24 0833   BP: 140/81   Pulse: 75   Resp: 16  "  Temp: 98.2 °F (36.8 °C)   SpO2: 95%   Weight: 99.5 kg (219 lb 6.4 oz)   Height: 5' 7\" (1.702 m)         Physical Exam  Vitals reviewed.   Constitutional:       Appearance: He is well-developed.   HENT:      Head: Normocephalic and atraumatic.   Eyes:      Conjunctiva/sclera: Conjunctivae normal.      Pupils: Pupils are equal, round, and reactive to light.   Cardiovascular:      Rate and Rhythm: Normal rate and regular rhythm.      Pulses: no weak pulses.           Dorsalis pedis pulses are 2+ on the right side and 2+ on the left side.        Posterior tibial pulses are 2+ on the right side and 2+ on the left side.      Heart sounds: Normal heart sounds, S1 normal and S2 normal. No murmur heard.  Pulmonary:      Effort: Pulmonary effort is normal. No respiratory distress.      Breath sounds: Normal breath sounds. No wheezing.   Musculoskeletal:         General: Normal range of motion.      Cervical back: Normal range of motion and neck supple.   Feet:      Right foot:      Skin integrity: No ulcer, skin breakdown, erythema, warmth, callus or dry skin.      Left foot:      Skin integrity: No ulcer, skin breakdown, erythema, warmth, callus or dry skin.   Skin:     General: Skin is warm.   Neurological:      Mental Status: He is alert and oriented to person, place, and time.   Psychiatric:         Speech: Speech normal.         Behavior: Behavior normal.         Thought Content: Thought content normal.         Judgment: Judgment normal.              Diabetic Foot Exam    Patient's shoes and socks removed.    Right Foot/Ankle   Right Foot Inspection  Skin Exam: skin normal and skin intact. No dry skin, no warmth, no callus, no erythema, no maceration, no abnormal color, no pre-ulcer, no ulcer and no callus.     Toe Exam: ROM and strength within normal limits. No swelling    Sensory   Vibration: intact  Proprioception: intact  Monofilament testing: intact    Vascular  Capillary refills: < 3 seconds  The right DP pulse " is 2+. The right PT pulse is 2+.     Left Foot/Ankle  Left Foot Inspection  Skin Exam: skin normal and skin intact. No dry skin, no warmth, no erythema, no maceration, normal color, no pre-ulcer, no ulcer and no callus.     Toe Exam: ROM and strength within normal limits. No swelling.     Sensory   Vibration: intact  Proprioception: intact  Monofilament testing: intact    Vascular  Capillary refills: < 3 seconds  The left DP pulse is 2+. The left PT pulse is 2+.     Assign Risk Category  No deformity present  No loss of protective sensation  No weak pulses  Risk: 0        Enedelia Cabrera MD,   [facility]  12/9/2024

## 2024-12-10 LAB
ALBUMIN SERPL-MCNC: 4.8 G/DL (ref 3.8–4.9)
ALBUMIN/CREAT UR: 7 MG/G CREAT (ref 0–29)
ALP SERPL-CCNC: 87 IU/L (ref 44–121)
ALT SERPL-CCNC: 59 IU/L (ref 0–44)
AST SERPL-CCNC: 33 IU/L (ref 0–40)
BASOPHILS # BLD AUTO: 0.1 X10E3/UL (ref 0–0.2)
BASOPHILS NFR BLD AUTO: 1 %
BILIRUB SERPL-MCNC: 0.7 MG/DL (ref 0–1.2)
BUN SERPL-MCNC: 14 MG/DL (ref 6–24)
BUN/CREAT SERPL: 17 (ref 9–20)
CALCIUM SERPL-MCNC: 9.3 MG/DL (ref 8.7–10.2)
CHLORIDE SERPL-SCNC: 101 MMOL/L (ref 96–106)
CHOLEST SERPL-MCNC: 201 MG/DL (ref 100–199)
CHOLEST/HDLC SERPL: 4.9 RATIO (ref 0–5)
CO2 SERPL-SCNC: 24 MMOL/L (ref 20–29)
CREAT SERPL-MCNC: 0.84 MG/DL (ref 0.76–1.27)
CREAT UR-MCNC: 123.9 MG/DL
EGFR: 104 ML/MIN/1.73
EOSINOPHIL # BLD AUTO: 0.2 X10E3/UL (ref 0–0.4)
EOSINOPHIL NFR BLD AUTO: 3 %
ERYTHROCYTE [DISTWIDTH] IN BLOOD BY AUTOMATED COUNT: 12.7 % (ref 11.6–15.4)
EST. AVERAGE GLUCOSE BLD GHB EST-MCNC: 146 MG/DL
GLOBULIN SER-MCNC: 2.8 G/DL (ref 1.5–4.5)
GLUCOSE SERPL-MCNC: 132 MG/DL (ref 70–99)
HBA1C MFR BLD: 6.7 % (ref 4.8–5.6)
HCT VFR BLD AUTO: 48.4 % (ref 37.5–51)
HDLC SERPL-MCNC: 41 MG/DL
HGB BLD-MCNC: 16 G/DL (ref 13–17.7)
IMM GRANULOCYTES # BLD: 0 X10E3/UL (ref 0–0.1)
IMM GRANULOCYTES NFR BLD: 0 %
LDLC SERPL CALC-MCNC: 132 MG/DL (ref 0–99)
LYMPHOCYTES # BLD AUTO: 3 X10E3/UL (ref 0.7–3.1)
LYMPHOCYTES NFR BLD AUTO: 38 %
MCH RBC QN AUTO: 29 PG (ref 26.6–33)
MCHC RBC AUTO-ENTMCNC: 33.1 G/DL (ref 31.5–35.7)
MCV RBC AUTO: 88 FL (ref 79–97)
MICROALBUMIN UR-MCNC: 8.1 UG/ML
MICRODELETION SYND BLD/T FISH: NORMAL
MONOCYTES # BLD AUTO: 0.7 X10E3/UL (ref 0.1–0.9)
MONOCYTES NFR BLD AUTO: 9 %
NEUTROPHILS # BLD AUTO: 3.9 X10E3/UL (ref 1.4–7)
NEUTROPHILS NFR BLD AUTO: 49 %
PLATELET # BLD AUTO: 318 X10E3/UL (ref 150–450)
POTASSIUM SERPL-SCNC: 4.7 MMOL/L (ref 3.5–5.2)
PROT SERPL-MCNC: 7.6 G/DL (ref 6–8.5)
RBC # BLD AUTO: 5.51 X10E6/UL (ref 4.14–5.8)
SL AMB VLDL CHOLESTEROL CALC: 28 MG/DL (ref 5–40)
SODIUM SERPL-SCNC: 139 MMOL/L (ref 134–144)
TRIGL SERPL-MCNC: 158 MG/DL (ref 0–149)
WBC # BLD AUTO: 7.8 X10E3/UL (ref 3.4–10.8)

## 2024-12-11 ENCOUNTER — RESULTS FOLLOW-UP (OUTPATIENT)
Dept: FAMILY MEDICINE CLINIC | Facility: CLINIC | Age: 54
End: 2024-12-11

## 2025-04-20 DIAGNOSIS — I10 ESSENTIAL HYPERTENSION: ICD-10-CM

## 2025-04-20 RX ORDER — RAMIPRIL 10 MG/1
10 CAPSULE ORAL DAILY
Qty: 90 CAPSULE | Refills: 1 | Status: SHIPPED | OUTPATIENT
Start: 2025-04-20

## 2025-05-09 DIAGNOSIS — E11.65 TYPE 2 DIABETES MELLITUS WITH HYPERGLYCEMIA, WITHOUT LONG-TERM CURRENT USE OF INSULIN (HCC): ICD-10-CM

## 2025-07-21 ENCOUNTER — OFFICE VISIT (OUTPATIENT)
Dept: FAMILY MEDICINE CLINIC | Facility: CLINIC | Age: 55
End: 2025-07-21

## 2025-07-21 VITALS
HEART RATE: 74 BPM | TEMPERATURE: 97.1 F | SYSTOLIC BLOOD PRESSURE: 140 MMHG | OXYGEN SATURATION: 98 % | BODY MASS INDEX: 33.59 KG/M2 | WEIGHT: 214 LBS | DIASTOLIC BLOOD PRESSURE: 82 MMHG | HEIGHT: 67 IN | RESPIRATION RATE: 14 BRPM

## 2025-07-21 DIAGNOSIS — E11.65 TYPE 2 DIABETES MELLITUS WITH HYPERGLYCEMIA, WITHOUT LONG-TERM CURRENT USE OF INSULIN (HCC): Primary | ICD-10-CM

## 2025-07-21 DIAGNOSIS — I10 ESSENTIAL HYPERTENSION: ICD-10-CM

## 2025-07-21 DIAGNOSIS — E78.5 HYPERLIPIDEMIA, UNSPECIFIED HYPERLIPIDEMIA TYPE: ICD-10-CM

## 2025-07-21 LAB — SL AMB POCT HEMOGLOBIN AIC: 6 (ref ?–6.5)

## 2025-07-21 PROCEDURE — 99213 OFFICE O/P EST LOW 20 MIN: CPT | Performed by: FAMILY MEDICINE

## 2025-07-21 PROCEDURE — 83036 HEMOGLOBIN GLYCOSYLATED A1C: CPT | Performed by: FAMILY MEDICINE

## 2025-07-21 NOTE — ASSESSMENT & PLAN NOTE
Lab Results   Component Value Date    HGBA1C 6.0 07/21/2025       Orders:  •  POCT hemoglobin A1c  •  Hemoglobin A1C; Future  •  Lipid panel; Future  •  Comprehensive metabolic panel; Future

## 2025-07-22 NOTE — ASSESSMENT & PLAN NOTE
If patient is still elevated recommend a calcium score test and recommend starting on a statin therapy

## 2025-07-22 NOTE — PROGRESS NOTES
"Name: Navdeep Brewer      : 1970      MRN: [id number]  Encounter Provider: Enedelia Cabrera MD  Encounter Date: 2025   Encounter department: [facility]    Assessment & Plan  Type 2 diabetes mellitus with hyperglycemia, without long-term current use of insulin ([facility])    Lab Results   Component Value Date    HGBA1C 6.0 2025       Orders:  •  POCT hemoglobin A1c  •  Hemoglobin A1C; Future  •  Lipid panel; Future  •  Comprehensive metabolic panel; Future    Essential hypertension         Hyperlipidemia, unspecified hyperlipidemia type  If patient is still elevated recommend a calcium score test and recommend starting on a statin therapy            History of Present Illness     HPI  55-year-old male presenting to the office states that he has not been eating very healthy.  States he is very active at work but has not been exercising at home.  Patient does have a family history of cardiac disease.  Review of Systems   Constitutional:  Negative for activity change, appetite change, chills, fatigue and fever.   HENT:  Negative for congestion.    Respiratory:  Negative for cough, chest tightness and shortness of breath.    Cardiovascular:  Negative for chest pain and leg swelling.   Gastrointestinal:  Negative for abdominal distention, abdominal pain, constipation, diarrhea, nausea and vomiting.   All other systems reviewed and are negative.    Past Medical History[1]  Past Surgical History[1]  Family History[1]  Social History[1]  Medications[1]  Allergies   Allergen Reactions   • Amlodipine Swelling   • Aspirin    • Statins Other (See Comments)     Brain fog       Immunization History   Administered Date(s) Administered   • Pneumococcal Polysaccharide PPV23 2016   • Tdap 2016     Objective   /82 (BP Location: Left arm, Patient Position: Sitting, Cuff Size: Standard)   Pulse 74   Temp (!) 97.1 °F (36.2 °C) (Temporal)   Resp 14   Ht 5' 7\" (1.702 m)   Wt " 97.1 kg (214 lb)   SpO2 98%   BMI 33.52 kg/m²     Physical Exam  Constitutional:       Appearance: He is well-developed.   HENT:      Head: Normocephalic and atraumatic.     Eyes:      Conjunctiva/sclera: Conjunctivae normal.      Pupils: Pupils are equal, round, and reactive to light.     Pulmonary:      Effort: Pulmonary effort is normal.     Neurological:      Mental Status: He is alert and oriented to person, place, and time.     Psychiatric:         Behavior: Behavior normal.         Thought Content: Thought content normal.         Judgment: Judgment normal.                [1]  Past Medical History:  Diagnosis Date   • Chronic allergic conjunctivitis     Resolved 2/2/2016    • Diabetes mellitus (HCC)    • GERD (gastroesophageal reflux disease)    • Hypertension    [1]  Past Surgical History:  Procedure Laterality Date   • EYE SURGERY      wxc. of ptergium of eye x 2   [1]  Family History  Problem Relation Name Age of Onset   • Heart disease Mother     • Heart disease Father     [1]  Social History  Tobacco Use   • Smoking status: Never     Passive exposure: Never   • Smokeless tobacco: Never   Vaping Use   • Vaping status: Never Used   Substance and Sexual Activity   • Alcohol use: No   • Drug use: No   [1]  Current Outpatient Medications on File Prior to Visit   Medication Sig   • Ascorbic Acid (vitamin C) 1000 MG tablet Take 1,000 mg by mouth in the morning.   • hydroCHLOROthiazide 12.5 mg tablet Take 1 tablet (12.5 mg total) by mouth daily   • metFORMIN (GLUCOPHAGE) 1000 MG tablet Take 1 tablet (1,000 mg total) by mouth 2 (two) times a day with meals   • omeprazole (PriLOSEC) 20 mg delayed release capsule Take 20 mg by mouth in the morning.   • ramipril (ALTACE) 10 MG capsule take 1 capsule by mouth once daily   • vitamin E 100 UNIT capsule Take 100 Units by mouth in the morning.